# Patient Record
Sex: FEMALE | Race: WHITE | NOT HISPANIC OR LATINO | URBAN - METROPOLITAN AREA
[De-identification: names, ages, dates, MRNs, and addresses within clinical notes are randomized per-mention and may not be internally consistent; named-entity substitution may affect disease eponyms.]

---

## 2017-01-02 ENCOUNTER — AMBULATORY - HEALTHEAST (OUTPATIENT)
Dept: ADMINISTRATIVE | Facility: CLINIC | Age: 77
End: 2017-01-02

## 2017-01-03 ENCOUNTER — OFFICE VISIT - HEALTHEAST (OUTPATIENT)
Dept: GERIATRICS | Facility: CLINIC | Age: 77
End: 2017-01-03

## 2017-01-03 DIAGNOSIS — Z96.659 S/P TOTAL KNEE ARTHROPLASTY: ICD-10-CM

## 2017-01-03 DIAGNOSIS — M79.662 PAIN OF LEFT CALF: ICD-10-CM

## 2017-01-03 DIAGNOSIS — M25.471 ANKLE SWELLING, RIGHT: ICD-10-CM

## 2017-01-03 DIAGNOSIS — K59.00 CONSTIPATION: ICD-10-CM

## 2017-01-03 DIAGNOSIS — E03.9 HYPOTHYROIDISM: ICD-10-CM

## 2017-01-06 ENCOUNTER — OFFICE VISIT - HEALTHEAST (OUTPATIENT)
Dept: GERIATRICS | Facility: CLINIC | Age: 77
End: 2017-01-06

## 2017-01-06 DIAGNOSIS — K59.00 CONSTIPATION: ICD-10-CM

## 2017-01-06 DIAGNOSIS — Z96.659 TOTAL KNEE REPLACEMENT STATUS: ICD-10-CM

## 2017-01-06 DIAGNOSIS — R52 PAIN MANAGEMENT: ICD-10-CM

## 2017-01-06 DIAGNOSIS — M62.838 MUSCLE SPASM: ICD-10-CM

## 2017-01-10 ENCOUNTER — OFFICE VISIT - HEALTHEAST (OUTPATIENT)
Dept: GERIATRICS | Facility: CLINIC | Age: 77
End: 2017-01-10

## 2017-01-10 DIAGNOSIS — Z96.659 TOTAL KNEE REPLACEMENT STATUS: ICD-10-CM

## 2017-01-10 DIAGNOSIS — R52 PAIN MANAGEMENT: ICD-10-CM

## 2017-01-10 DIAGNOSIS — K21.9 ESOPHAGEAL REFLUX: ICD-10-CM

## 2017-01-10 DIAGNOSIS — K59.00 CONSTIPATION: ICD-10-CM

## 2017-01-13 ENCOUNTER — OFFICE VISIT - HEALTHEAST (OUTPATIENT)
Dept: GERIATRICS | Facility: CLINIC | Age: 77
End: 2017-01-13

## 2017-01-13 DIAGNOSIS — Z96.659 TOTAL KNEE REPLACEMENT STATUS: ICD-10-CM

## 2017-01-13 DIAGNOSIS — M15.9 OSTEOARTHRITIS OF MULTIPLE JOINTS: ICD-10-CM

## 2017-01-13 DIAGNOSIS — K59.00 CONSTIPATION: ICD-10-CM

## 2017-01-13 DIAGNOSIS — E03.9 HYPOTHYROIDISM: ICD-10-CM

## 2017-01-16 ENCOUNTER — COMMUNICATION - HEALTHEAST (OUTPATIENT)
Dept: INTERNAL MEDICINE | Facility: CLINIC | Age: 77
End: 2017-01-16

## 2017-01-16 ENCOUNTER — AMBULATORY - HEALTHEAST (OUTPATIENT)
Dept: GERIATRICS | Facility: CLINIC | Age: 77
End: 2017-01-16

## 2017-01-16 ENCOUNTER — COMMUNICATION - HEALTHEAST (OUTPATIENT)
Dept: GERIATRICS | Facility: CLINIC | Age: 77
End: 2017-01-16

## 2017-01-17 ENCOUNTER — COMMUNICATION - HEALTHEAST (OUTPATIENT)
Dept: INTERNAL MEDICINE | Facility: CLINIC | Age: 77
End: 2017-01-17

## 2017-01-27 ENCOUNTER — COMMUNICATION - HEALTHEAST (OUTPATIENT)
Dept: INTERNAL MEDICINE | Facility: CLINIC | Age: 77
End: 2017-01-27

## 2017-01-30 ENCOUNTER — COMMUNICATION - HEALTHEAST (OUTPATIENT)
Dept: INTERNAL MEDICINE | Facility: CLINIC | Age: 77
End: 2017-01-30

## 2017-01-30 DIAGNOSIS — M25.569 KNEE PAIN: ICD-10-CM

## 2017-01-31 ENCOUNTER — COMMUNICATION - HEALTHEAST (OUTPATIENT)
Dept: INTERNAL MEDICINE | Facility: CLINIC | Age: 77
End: 2017-01-31

## 2017-01-31 ENCOUNTER — OFFICE VISIT - HEALTHEAST (OUTPATIENT)
Dept: INTERNAL MEDICINE | Facility: CLINIC | Age: 77
End: 2017-01-31

## 2017-01-31 DIAGNOSIS — E53.8 OTHER B-COMPLEX DEFICIENCIES: ICD-10-CM

## 2017-01-31 DIAGNOSIS — Z96.652 STATUS POST TOTAL LEFT KNEE REPLACEMENT: ICD-10-CM

## 2017-01-31 DIAGNOSIS — E03.9 ACQUIRED HYPOTHYROIDISM: ICD-10-CM

## 2017-01-31 DIAGNOSIS — K59.00 CONSTIPATION: ICD-10-CM

## 2017-03-15 ENCOUNTER — COMMUNICATION - HEALTHEAST (OUTPATIENT)
Dept: INTERNAL MEDICINE | Facility: CLINIC | Age: 77
End: 2017-03-15

## 2017-03-15 DIAGNOSIS — G47.00 INSOMNIA: ICD-10-CM

## 2017-03-21 ENCOUNTER — RECORDS - HEALTHEAST (OUTPATIENT)
Dept: ADMINISTRATIVE | Facility: OTHER | Age: 77
End: 2017-03-21

## 2017-03-27 ENCOUNTER — COMMUNICATION - HEALTHEAST (OUTPATIENT)
Dept: INTERNAL MEDICINE | Facility: CLINIC | Age: 77
End: 2017-03-27

## 2017-03-27 DIAGNOSIS — E56.9 VITAMIN DEFICIENCY SYNDROME: ICD-10-CM

## 2017-03-27 DIAGNOSIS — E03.9 HYPOTHYROIDISM (ACQUIRED): ICD-10-CM

## 2017-05-25 ENCOUNTER — COMMUNICATION - HEALTHEAST (OUTPATIENT)
Dept: INTERNAL MEDICINE | Facility: CLINIC | Age: 77
End: 2017-05-25

## 2017-06-06 ENCOUNTER — COMMUNICATION - HEALTHEAST (OUTPATIENT)
Dept: INTERNAL MEDICINE | Facility: CLINIC | Age: 77
End: 2017-06-06

## 2017-06-06 DIAGNOSIS — E56.9 VITAMIN DEFICIENCY SYNDROME: ICD-10-CM

## 2017-06-06 DIAGNOSIS — G47.00 INSOMNIA: ICD-10-CM

## 2017-06-21 ENCOUNTER — AMBULATORY - HEALTHEAST (OUTPATIENT)
Dept: NURSING | Facility: CLINIC | Age: 77
End: 2017-06-21

## 2017-06-21 DIAGNOSIS — M81.0 OSTEOPOROSIS: ICD-10-CM

## 2017-06-26 ENCOUNTER — AMBULATORY - HEALTHEAST (OUTPATIENT)
Dept: NURSING | Facility: CLINIC | Age: 77
End: 2017-06-26

## 2017-06-27 ENCOUNTER — COMMUNICATION - HEALTHEAST (OUTPATIENT)
Dept: INTERNAL MEDICINE | Facility: CLINIC | Age: 77
End: 2017-06-27

## 2017-08-31 ENCOUNTER — COMMUNICATION - HEALTHEAST (OUTPATIENT)
Dept: INTERNAL MEDICINE | Facility: CLINIC | Age: 77
End: 2017-08-31

## 2017-08-31 DIAGNOSIS — G47.00 INSOMNIA: ICD-10-CM

## 2017-09-21 ENCOUNTER — COMMUNICATION - HEALTHEAST (OUTPATIENT)
Dept: INTERNAL MEDICINE | Facility: CLINIC | Age: 77
End: 2017-09-21

## 2017-09-21 DIAGNOSIS — E03.9 HYPOTHYROIDISM (ACQUIRED): ICD-10-CM

## 2017-10-09 ENCOUNTER — COMMUNICATION - HEALTHEAST (OUTPATIENT)
Dept: INTERNAL MEDICINE | Facility: CLINIC | Age: 77
End: 2017-10-09

## 2017-10-09 DIAGNOSIS — E56.9 VITAMIN DEFICIENCY SYNDROME: ICD-10-CM

## 2017-10-11 ENCOUNTER — COMMUNICATION - HEALTHEAST (OUTPATIENT)
Dept: INTERNAL MEDICINE | Facility: CLINIC | Age: 77
End: 2017-10-11

## 2017-10-11 DIAGNOSIS — E56.9 VITAMIN DEFICIENCY SYNDROME: ICD-10-CM

## 2017-11-14 ENCOUNTER — COMMUNICATION - HEALTHEAST (OUTPATIENT)
Dept: INTERNAL MEDICINE | Facility: CLINIC | Age: 77
End: 2017-11-14

## 2017-11-14 DIAGNOSIS — G47.00 INSOMNIA: ICD-10-CM

## 2018-01-17 ENCOUNTER — AMBULATORY - HEALTHEAST (OUTPATIENT)
Dept: INTERNAL MEDICINE | Facility: CLINIC | Age: 78
End: 2018-01-17

## 2018-01-17 ENCOUNTER — COMMUNICATION - HEALTHEAST (OUTPATIENT)
Dept: INTERNAL MEDICINE | Facility: CLINIC | Age: 78
End: 2018-01-17

## 2018-02-08 ENCOUNTER — COMMUNICATION - HEALTHEAST (OUTPATIENT)
Dept: INTERNAL MEDICINE | Facility: CLINIC | Age: 78
End: 2018-02-08

## 2018-02-08 DIAGNOSIS — G47.00 INSOMNIA: ICD-10-CM

## 2018-05-03 ENCOUNTER — COMMUNICATION - HEALTHEAST (OUTPATIENT)
Dept: INTERNAL MEDICINE | Facility: CLINIC | Age: 78
End: 2018-05-03

## 2018-05-03 DIAGNOSIS — G47.00 INSOMNIA: ICD-10-CM

## 2018-05-21 ENCOUNTER — COMMUNICATION - HEALTHEAST (OUTPATIENT)
Dept: INTERNAL MEDICINE | Facility: CLINIC | Age: 78
End: 2018-05-21

## 2018-06-04 ENCOUNTER — AMBULATORY - HEALTHEAST (OUTPATIENT)
Dept: INTERNAL MEDICINE | Facility: CLINIC | Age: 78
End: 2018-06-04

## 2018-06-04 DIAGNOSIS — M81.0 OSTEOPOROSIS: ICD-10-CM

## 2018-06-19 ENCOUNTER — COMMUNICATION - HEALTHEAST (OUTPATIENT)
Dept: INTERNAL MEDICINE | Facility: CLINIC | Age: 78
End: 2018-06-19

## 2018-07-10 ENCOUNTER — COMMUNICATION - HEALTHEAST (OUTPATIENT)
Dept: INTERNAL MEDICINE | Facility: CLINIC | Age: 78
End: 2018-07-10

## 2018-07-10 DIAGNOSIS — E03.9 HYPOTHYROIDISM (ACQUIRED): ICD-10-CM

## 2018-07-10 DIAGNOSIS — E56.9 VITAMIN DEFICIENCY SYNDROME: ICD-10-CM

## 2018-07-24 ENCOUNTER — OFFICE VISIT - HEALTHEAST (OUTPATIENT)
Dept: INTERNAL MEDICINE | Facility: CLINIC | Age: 78
End: 2018-07-24

## 2018-07-24 DIAGNOSIS — E56.9 VITAMIN DEFICIENCY SYNDROME: ICD-10-CM

## 2018-07-24 DIAGNOSIS — G47.00 INSOMNIA: ICD-10-CM

## 2018-07-24 DIAGNOSIS — M19.90 DJD (DEGENERATIVE JOINT DISEASE): ICD-10-CM

## 2018-07-24 DIAGNOSIS — M81.0 OSTEOPOROSIS: ICD-10-CM

## 2018-07-24 DIAGNOSIS — R52 PAIN MANAGEMENT: ICD-10-CM

## 2018-07-24 DIAGNOSIS — Z98.84 H/O GASTRIC BYPASS: ICD-10-CM

## 2018-07-24 DIAGNOSIS — E03.9 ACQUIRED HYPOTHYROIDISM: ICD-10-CM

## 2018-07-24 LAB
ALBUMIN SERPL-MCNC: 4 G/DL (ref 3.5–5)
ALP SERPL-CCNC: 186 U/L (ref 45–120)
ALT SERPL W P-5'-P-CCNC: 16 U/L (ref 0–45)
ANION GAP SERPL CALCULATED.3IONS-SCNC: 9 MMOL/L (ref 5–18)
AST SERPL W P-5'-P-CCNC: 21 U/L (ref 0–40)
BILIRUB SERPL-MCNC: 0.4 MG/DL (ref 0–1)
BUN SERPL-MCNC: 12 MG/DL (ref 8–28)
C REACTIVE PROTEIN LHE: <0.1 MG/DL (ref 0–0.8)
CALCIUM SERPL-MCNC: 9.8 MG/DL (ref 8.5–10.5)
CHLORIDE BLD-SCNC: 106 MMOL/L (ref 98–107)
CO2 SERPL-SCNC: 27 MMOL/L (ref 22–31)
CREAT SERPL-MCNC: 0.84 MG/DL (ref 0.6–1.1)
ERYTHROCYTE [DISTWIDTH] IN BLOOD BY AUTOMATED COUNT: 14.4 % (ref 11–14.5)
ERYTHROCYTE [SEDIMENTATION RATE] IN BLOOD BY WESTERGREN METHOD: 13 MM/HR (ref 0–20)
FERRITIN SERPL-MCNC: 7 NG/ML (ref 10–130)
GFR SERPL CREATININE-BSD FRML MDRD: >60 ML/MIN/1.73M2
GLUCOSE BLD-MCNC: 96 MG/DL (ref 70–125)
HCT VFR BLD AUTO: 32 % (ref 35–47)
HGB BLD-MCNC: 10.1 G/DL (ref 12–16)
MCH RBC QN AUTO: 23.2 PG (ref 27–34)
MCHC RBC AUTO-ENTMCNC: 31.5 G/DL (ref 32–36)
MCV RBC AUTO: 74 FL (ref 80–100)
PLATELET # BLD AUTO: 371 THOU/UL (ref 140–440)
PMV BLD AUTO: 7 FL (ref 7–10)
POTASSIUM BLD-SCNC: 5.3 MMOL/L (ref 3.5–5)
PROT SERPL-MCNC: 6.7 G/DL (ref 6–8)
RBC # BLD AUTO: 4.35 MILL/UL (ref 3.8–5.4)
SODIUM SERPL-SCNC: 142 MMOL/L (ref 136–145)
TSH SERPL DL<=0.005 MIU/L-ACNC: 0.67 UIU/ML (ref 0.3–5)
WBC: 6.3 THOU/UL (ref 4–11)

## 2018-07-25 ENCOUNTER — COMMUNICATION - HEALTHEAST (OUTPATIENT)
Dept: INTERNAL MEDICINE | Facility: CLINIC | Age: 78
End: 2018-07-25

## 2018-07-25 LAB — 25(OH)D3 SERPL-MCNC: 33.3 NG/ML (ref 30–80)

## 2018-08-24 ENCOUNTER — OFFICE VISIT - HEALTHEAST (OUTPATIENT)
Dept: INTERNAL MEDICINE | Facility: CLINIC | Age: 78
End: 2018-08-24

## 2018-08-24 ENCOUNTER — COMMUNICATION - HEALTHEAST (OUTPATIENT)
Dept: INTERNAL MEDICINE | Facility: CLINIC | Age: 78
End: 2018-08-24

## 2018-08-24 DIAGNOSIS — M25.512 SHOULDER PAIN, BILATERAL: ICD-10-CM

## 2018-08-24 DIAGNOSIS — S22.49XA RIB FRACTURES: ICD-10-CM

## 2018-08-24 DIAGNOSIS — M25.511 SHOULDER PAIN, BILATERAL: ICD-10-CM

## 2018-08-24 DIAGNOSIS — S82.899A ANKLE FRACTURE: ICD-10-CM

## 2018-09-10 ENCOUNTER — COMMUNICATION - HEALTHEAST (OUTPATIENT)
Dept: INTERNAL MEDICINE | Facility: CLINIC | Age: 78
End: 2018-09-10

## 2018-09-12 ENCOUNTER — RECORDS - HEALTHEAST (OUTPATIENT)
Dept: ADMINISTRATIVE | Facility: OTHER | Age: 78
End: 2018-09-12

## 2019-01-18 ENCOUNTER — COMMUNICATION - HEALTHEAST (OUTPATIENT)
Dept: INTERNAL MEDICINE | Facility: CLINIC | Age: 79
End: 2019-01-18

## 2019-01-18 ENCOUNTER — RECORDS - HEALTHEAST (OUTPATIENT)
Dept: ADMINISTRATIVE | Facility: OTHER | Age: 79
End: 2019-01-18

## 2019-02-14 ENCOUNTER — AMBULATORY - HEALTHEAST (OUTPATIENT)
Dept: INTERNAL MEDICINE | Facility: CLINIC | Age: 79
End: 2019-02-14

## 2019-02-14 DIAGNOSIS — M81.0 OSTEOPOROSIS: ICD-10-CM

## 2019-02-18 ENCOUNTER — COMMUNICATION - HEALTHEAST (OUTPATIENT)
Dept: INTERNAL MEDICINE | Facility: CLINIC | Age: 79
End: 2019-02-18

## 2019-02-22 ENCOUNTER — RECORDS - HEALTHEAST (OUTPATIENT)
Dept: ADMINISTRATIVE | Facility: OTHER | Age: 79
End: 2019-02-22

## 2019-04-15 ENCOUNTER — COMMUNICATION - HEALTHEAST (OUTPATIENT)
Dept: INTERNAL MEDICINE | Facility: CLINIC | Age: 79
End: 2019-04-15

## 2019-04-15 DIAGNOSIS — G47.00 INSOMNIA: ICD-10-CM

## 2019-04-24 ENCOUNTER — COMMUNICATION - HEALTHEAST (OUTPATIENT)
Dept: INTERNAL MEDICINE | Facility: CLINIC | Age: 79
End: 2019-04-24

## 2019-04-24 DIAGNOSIS — E03.9 HYPOTHYROIDISM (ACQUIRED): ICD-10-CM

## 2019-05-22 ENCOUNTER — HOSPITAL ENCOUNTER (OUTPATIENT)
Dept: LAB | Age: 79
Setting detail: SPECIMEN
Discharge: HOME | End: 2019-05-22

## 2019-05-22 ENCOUNTER — OFFICE VISIT - HEALTHEAST (OUTPATIENT)
Dept: INTERNAL MEDICINE | Facility: CLINIC | Age: 79
End: 2019-05-22

## 2019-05-22 DIAGNOSIS — M25.511 CHRONIC PAIN OF BOTH SHOULDERS: ICD-10-CM

## 2019-05-22 DIAGNOSIS — E53.8 VITAMIN B12 DEFICIENCY (NON ANEMIC): ICD-10-CM

## 2019-05-22 DIAGNOSIS — Z98.84 BARIATRIC SURGERY STATUS: ICD-10-CM

## 2019-05-22 DIAGNOSIS — E03.9 ACQUIRED HYPOTHYROIDISM: ICD-10-CM

## 2019-05-22 DIAGNOSIS — D50.8 OTHER IRON DEFICIENCY ANEMIA: ICD-10-CM

## 2019-05-22 DIAGNOSIS — M25.512 CHRONIC PAIN OF BOTH SHOULDERS: ICD-10-CM

## 2019-05-22 DIAGNOSIS — G89.29 CHRONIC PAIN OF BOTH SHOULDERS: ICD-10-CM

## 2019-05-22 DIAGNOSIS — M48.061 SPINAL STENOSIS OF LUMBAR REGION WITHOUT NEUROGENIC CLAUDICATION: ICD-10-CM

## 2019-05-22 DIAGNOSIS — M81.0 AGE-RELATED OSTEOPOROSIS WITHOUT CURRENT PATHOLOGICAL FRACTURE: ICD-10-CM

## 2019-05-22 LAB
ALBUMIN SERPL-MCNC: 4.2 G/DL (ref 3.5–5)
ALP SERPL-CCNC: 132 U/L (ref 45–120)
ALT SERPL W P-5'-P-CCNC: 16 U/L (ref 0–45)
ANION GAP SERPL CALCULATED.3IONS-SCNC: 10 MMOL/L (ref 5–18)
AST SERPL W P-5'-P-CCNC: 23 U/L (ref 0–40)
BILIRUB SERPL-MCNC: 0.3 MG/DL (ref 0–1)
BUN SERPL-MCNC: 13 MG/DL (ref 8–28)
CALCIUM SERPL-MCNC: 9.9 MG/DL (ref 8.5–10.5)
CHLORIDE BLD-SCNC: 107 MMOL/L (ref 98–107)
CO2 SERPL-SCNC: 25 MMOL/L (ref 22–31)
CREAT SERPL-MCNC: 0.97 MG/DL (ref 0.6–1.1)
ERYTHROCYTE [DISTWIDTH] IN BLOOD BY AUTOMATED COUNT: 15.3 % (ref 11–14.5)
FERRITIN SERPL-MCNC: 6 NG/ML (ref 10–130)
GFR SERPL CREATININE-BSD FRML MDRD: 56 ML/MIN/1.73M2
GLUCOSE BLD-MCNC: 94 MG/DL (ref 70–125)
HCT VFR BLD AUTO: 31 % (ref 35–47)
HGB BLD-MCNC: 9.8 G/DL (ref 12–16)
MCH RBC QN AUTO: 23.3 PG (ref 27–34)
MCHC RBC AUTO-ENTMCNC: 31.6 G/DL (ref 32–36)
MCV RBC AUTO: 74 FL (ref 80–100)
PLATELET # BLD AUTO: 322 THOU/UL (ref 140–440)
PMV BLD AUTO: 7 FL (ref 7–10)
POTASSIUM BLD-SCNC: 5.1 MMOL/L (ref 3.5–5)
PROT SERPL-MCNC: 6.9 G/DL (ref 6–8)
RBC # BLD AUTO: 4.19 MILL/UL (ref 3.8–5.4)
SODIUM SERPL-SCNC: 142 MMOL/L (ref 136–145)
T4 FREE SERPL-MCNC: 0.7 NG/DL (ref 0.7–1.8)
TSH SERPL DL<=0.005 MIU/L-ACNC: 30.75 UIU/ML (ref 0.3–5)
VIT B12 SERPL-MCNC: 153 PG/ML (ref 213–816)
WBC: 4.9 THOU/UL (ref 4–11)

## 2019-05-22 ASSESSMENT — MIFFLIN-ST. JEOR: SCORE: 1346.32

## 2019-05-23 ENCOUNTER — COMMUNICATION - HEALTHEAST (OUTPATIENT)
Dept: INTERNAL MEDICINE | Facility: CLINIC | Age: 79
End: 2019-05-23

## 2019-05-23 DIAGNOSIS — E03.9 ACQUIRED HYPOTHYROIDISM: ICD-10-CM

## 2019-05-23 LAB — 25(OH)D3 SERPL-MCNC: 13.2 NG/ML (ref 30–80)

## 2019-06-13 ENCOUNTER — RECORDS - HEALTHEAST (OUTPATIENT)
Dept: ADMINISTRATIVE | Facility: OTHER | Age: 79
End: 2019-06-13

## 2019-07-15 ENCOUNTER — COMMUNICATION - HEALTHEAST (OUTPATIENT)
Dept: INTERNAL MEDICINE | Facility: CLINIC | Age: 79
End: 2019-07-15

## 2019-08-13 ENCOUNTER — OFFICE VISIT - HEALTHEAST (OUTPATIENT)
Dept: FAMILY MEDICINE | Facility: CLINIC | Age: 79
End: 2019-08-13

## 2019-08-13 ENCOUNTER — COMMUNICATION - HEALTHEAST (OUTPATIENT)
Dept: SCHEDULING | Facility: CLINIC | Age: 79
End: 2019-08-13

## 2019-08-13 ENCOUNTER — HOSPITAL ENCOUNTER (OUTPATIENT)
Dept: LAB | Age: 79
Setting detail: SPECIMEN
Discharge: HOME | End: 2019-08-13

## 2019-08-13 DIAGNOSIS — R30.0 DYSURIA: ICD-10-CM

## 2019-08-13 DIAGNOSIS — N30.00 ACUTE CYSTITIS WITHOUT HEMATURIA: ICD-10-CM

## 2019-08-13 LAB
BACTERIA #/AREA URNS HPF: ABNORMAL HPF
RBC #/AREA URNS AUTO: ABNORMAL HPF
SQUAMOUS #/AREA URNS AUTO: ABNORMAL LPF
WBC #/AREA URNS AUTO: ABNORMAL HPF
WBC CLUMPS #/AREA URNS HPF: PRESENT /[HPF]

## 2019-08-14 ENCOUNTER — COMMUNICATION - HEALTHEAST (OUTPATIENT)
Dept: FAMILY MEDICINE | Facility: CLINIC | Age: 79
End: 2019-08-14

## 2019-08-14 LAB — BACTERIA SPEC CULT: NO GROWTH

## 2019-08-16 ENCOUNTER — AMBULATORY - HEALTHEAST (OUTPATIENT)
Dept: INTERNAL MEDICINE | Facility: CLINIC | Age: 79
End: 2019-08-16

## 2019-08-16 ENCOUNTER — OFFICE VISIT - HEALTHEAST (OUTPATIENT)
Dept: INTERNAL MEDICINE | Facility: CLINIC | Age: 79
End: 2019-08-16

## 2019-08-16 DIAGNOSIS — E55.9 VITAMIN D DEFICIENCY: ICD-10-CM

## 2019-08-16 DIAGNOSIS — E03.9 ACQUIRED HYPOTHYROIDISM: ICD-10-CM

## 2019-08-16 DIAGNOSIS — R00.1 BRADYCARDIA: ICD-10-CM

## 2019-08-16 DIAGNOSIS — Z98.84 BARIATRIC SURGERY STATUS: ICD-10-CM

## 2019-08-16 DIAGNOSIS — R26.9 ABNORMAL GAIT: ICD-10-CM

## 2019-08-16 DIAGNOSIS — D50.8 IRON DEFICIENCY ANEMIA SECONDARY TO INADEQUATE DIETARY IRON INTAKE: ICD-10-CM

## 2019-08-16 DIAGNOSIS — Z00.00 PREVENTATIVE HEALTH CARE: ICD-10-CM

## 2019-08-16 DIAGNOSIS — H57.02 ANISOCORIA: ICD-10-CM

## 2019-08-16 DIAGNOSIS — E53.8 VITAMIN B12 DEFICIENCY (NON ANEMIC): ICD-10-CM

## 2019-08-16 DIAGNOSIS — M81.0 AGE-RELATED OSTEOPOROSIS WITHOUT CURRENT PATHOLOGICAL FRACTURE: ICD-10-CM

## 2019-08-16 DIAGNOSIS — Z12.31 ENCOUNTER FOR SCREENING MAMMOGRAM FOR MALIGNANT NEOPLASM OF BREAST: ICD-10-CM

## 2019-08-16 DIAGNOSIS — R30.0 DYSURIA: ICD-10-CM

## 2019-08-16 LAB
ALBUMIN SERPL-MCNC: 4 G/DL (ref 3.5–5)
ALBUMIN UR-MCNC: NEGATIVE MG/DL
ALP SERPL-CCNC: 59 U/L (ref 45–120)
ALT SERPL W P-5'-P-CCNC: 17 U/L (ref 0–45)
ANION GAP SERPL CALCULATED.3IONS-SCNC: 9 MMOL/L (ref 5–18)
APPEARANCE UR: CLEAR
AST SERPL W P-5'-P-CCNC: 25 U/L (ref 0–40)
BILIRUB SERPL-MCNC: 0.4 MG/DL (ref 0–1)
BILIRUB UR QL STRIP: NEGATIVE
BUN SERPL-MCNC: 11 MG/DL (ref 8–28)
CALCIUM SERPL-MCNC: 8.9 MG/DL (ref 8.5–10.5)
CHLORIDE BLD-SCNC: 106 MMOL/L (ref 98–107)
CHOLEST SERPL-MCNC: 209 MG/DL
CO2 SERPL-SCNC: 25 MMOL/L (ref 22–31)
COLOR UR AUTO: YELLOW
CREAT SERPL-MCNC: 0.86 MG/DL (ref 0.6–1.1)
ERYTHROCYTE [DISTWIDTH] IN BLOOD BY AUTOMATED COUNT: 17.2 % (ref 11–14.5)
FASTING STATUS PATIENT QL REPORTED: YES
FERRITIN SERPL-MCNC: 5 NG/ML (ref 10–130)
GFR SERPL CREATININE-BSD FRML MDRD: >60 ML/MIN/1.73M2
GLUCOSE BLD-MCNC: 92 MG/DL (ref 70–125)
GLUCOSE UR STRIP-MCNC: NEGATIVE MG/DL
HCT VFR BLD AUTO: 25.7 % (ref 35–47)
HDLC SERPL-MCNC: 59 MG/DL
HGB BLD-MCNC: 8.2 G/DL (ref 12–16)
HGB UR QL STRIP: NEGATIVE
KETONES UR STRIP-MCNC: NEGATIVE MG/DL
LDLC SERPL CALC-MCNC: 130 MG/DL
LEUKOCYTE ESTERASE UR QL STRIP: NEGATIVE
MCH RBC QN AUTO: 24.5 PG (ref 27–34)
MCHC RBC AUTO-ENTMCNC: 31.8 G/DL (ref 32–36)
MCV RBC AUTO: 77 FL (ref 80–100)
NITRATE UR QL: NEGATIVE
PH UR STRIP: 6.5 [PH] (ref 5–8)
PLATELET # BLD AUTO: 418 THOU/UL (ref 140–440)
PMV BLD AUTO: 6.9 FL (ref 7–10)
POTASSIUM BLD-SCNC: 3.7 MMOL/L (ref 3.5–5)
PROT SERPL-MCNC: 6.5 G/DL (ref 6–8)
RBC # BLD AUTO: 3.33 MILL/UL (ref 3.8–5.4)
SODIUM SERPL-SCNC: 140 MMOL/L (ref 136–145)
SP GR UR STRIP: 1.01 (ref 1–1.03)
T4 FREE SERPL-MCNC: <0.4 NG/DL (ref 0.7–1.8)
TRIGL SERPL-MCNC: 100 MG/DL
TSH SERPL DL<=0.005 MIU/L-ACNC: 63.83 UIU/ML (ref 0.3–5)
UROBILINOGEN UR STRIP-ACNC: NORMAL
VIT B12 SERPL-MCNC: 179 PG/ML (ref 213–816)
WBC: 6.1 THOU/UL (ref 4–11)

## 2019-08-16 ASSESSMENT — MIFFLIN-ST. JEOR: SCORE: 1293.87

## 2019-08-19 LAB — 25(OH)D3 SERPL-MCNC: 11.7 NG/ML (ref 30–80)

## 2019-08-20 ENCOUNTER — COMMUNICATION - HEALTHEAST (OUTPATIENT)
Dept: INTERNAL MEDICINE | Facility: CLINIC | Age: 79
End: 2019-08-20

## 2019-09-12 ENCOUNTER — AMBULATORY - HEALTHEAST (OUTPATIENT)
Dept: NURSING | Facility: CLINIC | Age: 79
End: 2019-09-12

## 2019-09-12 DIAGNOSIS — Z23 FLU VACCINE NEED: ICD-10-CM

## 2019-10-10 ENCOUNTER — COMMUNICATION - HEALTHEAST (OUTPATIENT)
Dept: INTERNAL MEDICINE | Facility: CLINIC | Age: 79
End: 2019-10-10

## 2019-10-23 ENCOUNTER — COMMUNICATION - HEALTHEAST (OUTPATIENT)
Dept: SCHEDULING | Facility: CLINIC | Age: 79
End: 2019-10-23

## 2019-10-23 DIAGNOSIS — G47.00 INSOMNIA: ICD-10-CM

## 2019-10-25 ENCOUNTER — COMMUNICATION - HEALTHEAST (OUTPATIENT)
Dept: INTERNAL MEDICINE | Facility: CLINIC | Age: 79
End: 2019-10-25

## 2019-10-25 DIAGNOSIS — G47.00 INSOMNIA: ICD-10-CM

## 2019-10-26 ENCOUNTER — RECORDS - HEALTHEAST (OUTPATIENT)
Dept: SCHEDULING | Facility: CLINIC | Age: 79
End: 2019-10-26

## 2019-11-08 ENCOUNTER — COMMUNICATION - HEALTHEAST (OUTPATIENT)
Dept: INTERNAL MEDICINE | Facility: CLINIC | Age: 79
End: 2019-11-08

## 2019-11-08 DIAGNOSIS — G47.00 INSOMNIA: ICD-10-CM

## 2020-04-29 ENCOUNTER — COMMUNICATION - HEALTHEAST (OUTPATIENT)
Dept: INTERNAL MEDICINE | Facility: CLINIC | Age: 80
End: 2020-04-29

## 2020-04-29 ENCOUNTER — OFFICE VISIT - HEALTHEAST (OUTPATIENT)
Dept: INTERNAL MEDICINE | Facility: CLINIC | Age: 80
End: 2020-04-29

## 2020-04-29 DIAGNOSIS — R30.0 DYSURIA: ICD-10-CM

## 2020-04-29 DIAGNOSIS — D64.9 ANEMIA, UNSPECIFIED TYPE: ICD-10-CM

## 2020-04-29 DIAGNOSIS — Z29.9 ENCOUNTER FOR PREVENTIVE MEASURE: ICD-10-CM

## 2020-04-29 DIAGNOSIS — Z98.84 BARIATRIC SURGERY STATUS: ICD-10-CM

## 2020-04-29 DIAGNOSIS — E03.9 ACQUIRED HYPOTHYROIDISM: ICD-10-CM

## 2020-04-29 DIAGNOSIS — D50.0 IRON DEFICIENCY ANEMIA DUE TO CHRONIC BLOOD LOSS: ICD-10-CM

## 2020-04-29 DIAGNOSIS — Z29.89 ALTITUDE SICKNESS PREVENTATIVE MEASURES: ICD-10-CM

## 2020-05-11 ENCOUNTER — COMMUNICATION - HEALTHEAST (OUTPATIENT)
Dept: INTERNAL MEDICINE | Facility: CLINIC | Age: 80
End: 2020-05-11

## 2020-05-11 DIAGNOSIS — E03.9 HYPOTHYROIDISM (ACQUIRED): ICD-10-CM

## 2020-07-29 ENCOUNTER — COMMUNICATION - HEALTHEAST (OUTPATIENT)
Dept: INTERNAL MEDICINE | Facility: CLINIC | Age: 80
End: 2020-07-29

## 2020-07-29 DIAGNOSIS — G47.00 INSOMNIA: ICD-10-CM

## 2020-11-16 ENCOUNTER — COMMUNICATION - HEALTHEAST (OUTPATIENT)
Dept: INTERNAL MEDICINE | Facility: CLINIC | Age: 80
End: 2020-11-16

## 2020-11-16 DIAGNOSIS — G47.00 INSOMNIA: ICD-10-CM

## 2021-03-18 ENCOUNTER — RECORDS - HEALTHEAST (OUTPATIENT)
Dept: ADMINISTRATIVE | Facility: OTHER | Age: 81
End: 2021-03-18

## 2021-03-20 ENCOUNTER — RECORDS - HEALTHEAST (OUTPATIENT)
Dept: ADMINISTRATIVE | Facility: OTHER | Age: 81
End: 2021-03-20

## 2021-03-22 ENCOUNTER — COMMUNICATION - HEALTHEAST (OUTPATIENT)
Dept: INTERNAL MEDICINE | Facility: CLINIC | Age: 81
End: 2021-03-22

## 2021-03-22 DIAGNOSIS — G47.00 INSOMNIA: ICD-10-CM

## 2021-03-31 ENCOUNTER — COMMUNICATION - HEALTHEAST (OUTPATIENT)
Dept: INTERNAL MEDICINE | Facility: CLINIC | Age: 81
End: 2021-03-31

## 2021-03-31 ENCOUNTER — OFFICE VISIT - HEALTHEAST (OUTPATIENT)
Dept: INTERNAL MEDICINE | Facility: CLINIC | Age: 81
End: 2021-03-31

## 2021-03-31 DIAGNOSIS — D64.9 ANEMIA, UNSPECIFIED TYPE: ICD-10-CM

## 2021-03-31 DIAGNOSIS — T14.8XXA BRUISING: ICD-10-CM

## 2021-03-31 DIAGNOSIS — E56.9 VITAMIN DEFICIENCY: ICD-10-CM

## 2021-03-31 DIAGNOSIS — E61.1 IRON DEFICIENCY: ICD-10-CM

## 2021-03-31 DIAGNOSIS — R29.6 FALLS FREQUENTLY: ICD-10-CM

## 2021-03-31 DIAGNOSIS — M15.8 OTHER OSTEOARTHRITIS INVOLVING MULTIPLE JOINTS: ICD-10-CM

## 2021-03-31 DIAGNOSIS — E03.9 HYPOTHYROIDISM (ACQUIRED): ICD-10-CM

## 2021-03-31 DIAGNOSIS — E55.9 VITAMIN D DEFICIENCY, UNSPECIFIED: ICD-10-CM

## 2021-03-31 DIAGNOSIS — Z09 HOSPITAL DISCHARGE FOLLOW-UP: ICD-10-CM

## 2021-03-31 DIAGNOSIS — Z98.84 BARIATRIC SURGERY STATUS: ICD-10-CM

## 2021-03-31 LAB
ALBUMIN SERPL-MCNC: 4.2 G/DL (ref 3.5–5)
ALP SERPL-CCNC: 100 U/L (ref 45–120)
ALT SERPL W P-5'-P-CCNC: 16 U/L (ref 0–45)
ANION GAP SERPL CALCULATED.3IONS-SCNC: 12 MMOL/L (ref 5–18)
AST SERPL W P-5'-P-CCNC: 28 U/L (ref 0–40)
BILIRUB SERPL-MCNC: 0.5 MG/DL (ref 0–1)
BUN SERPL-MCNC: 13 MG/DL (ref 8–28)
CALCIUM SERPL-MCNC: 9.1 MG/DL (ref 8.5–10.5)
CHLORIDE BLD-SCNC: 101 MMOL/L (ref 98–107)
CO2 SERPL-SCNC: 23 MMOL/L (ref 22–31)
CREAT SERPL-MCNC: 1.04 MG/DL (ref 0.6–1.1)
ERYTHROCYTE [DISTWIDTH] IN BLOOD BY AUTOMATED COUNT: 21.9 % (ref 11–14.5)
FERRITIN SERPL-MCNC: 20 NG/ML (ref 10–130)
GFR SERPL CREATININE-BSD FRML MDRD: 51 ML/MIN/1.73M2
GLUCOSE BLD-MCNC: 85 MG/DL (ref 70–125)
HCT VFR BLD AUTO: 31.2 % (ref 35–47)
HGB BLD-MCNC: 9.1 G/DL (ref 12–16)
IRON SATN MFR SERPL: 15 % (ref 20–50)
IRON SERPL-MCNC: 72 UG/DL (ref 42–175)
MCH RBC QN AUTO: 23.6 PG (ref 27–34)
MCHC RBC AUTO-ENTMCNC: 29.2 G/DL (ref 32–36)
MCV RBC AUTO: 81 FL (ref 80–100)
PLATELET # BLD AUTO: 344 THOU/UL (ref 140–440)
PMV BLD AUTO: 8.8 FL (ref 7–10)
POTASSIUM BLD-SCNC: 4.2 MMOL/L (ref 3.5–5)
PROT SERPL-MCNC: 6.7 G/DL (ref 6–8)
RBC # BLD AUTO: 3.85 MILL/UL (ref 3.8–5.4)
SODIUM SERPL-SCNC: 136 MMOL/L (ref 136–145)
T4 FREE SERPL-MCNC: 0.5 NG/DL (ref 0.7–1.8)
TIBC SERPL-MCNC: 479 UG/DL (ref 313–563)
TRANSFERRIN SERPL-MCNC: 383 MG/DL (ref 212–360)
TSH SERPL DL<=0.005 MIU/L-ACNC: 62.83 UIU/ML (ref 0.3–5)
VIT B12 SERPL-MCNC: 469 PG/ML (ref 213–816)
WBC: 6.8 THOU/UL (ref 4–11)

## 2021-03-31 RX ORDER — FERROUS SULFATE 325(65) MG
1 TABLET ORAL 2 TIMES DAILY
Qty: 60 TABLET | Refills: 6 | Status: SHIPPED | OUTPATIENT
Start: 2021-03-31 | End: 2022-03-11

## 2021-03-31 RX ORDER — ERGOCALCIFEROL 1.25 MG/1
50000 CAPSULE ORAL WEEKLY
Qty: 12 CAPSULE | Refills: 1 | Status: SHIPPED | OUTPATIENT
Start: 2021-03-31 | End: 2021-06-17

## 2021-03-31 ASSESSMENT — MIFFLIN-ST. JEOR: SCORE: 1348.02

## 2021-04-01 ENCOUNTER — COMMUNICATION - HEALTHEAST (OUTPATIENT)
Dept: INTERNAL MEDICINE | Facility: CLINIC | Age: 81
End: 2021-04-01

## 2021-04-01 LAB — 25(OH)D3 SERPL-MCNC: 11.9 NG/ML (ref 30–80)

## 2021-04-09 ENCOUNTER — AMBULATORY - HEALTHEAST (OUTPATIENT)
Dept: OTHER | Facility: CLINIC | Age: 81
End: 2021-04-09

## 2021-04-09 ENCOUNTER — DOCUMENTATION ONLY (OUTPATIENT)
Dept: OTHER | Facility: CLINIC | Age: 81
End: 2021-04-09

## 2021-04-16 ENCOUNTER — COMMUNICATION - HEALTHEAST (OUTPATIENT)
Dept: ADMINISTRATIVE | Facility: CLINIC | Age: 81
End: 2021-04-16

## 2021-04-16 DIAGNOSIS — Z98.84 BARIATRIC SURGERY STATUS: ICD-10-CM

## 2021-04-16 DIAGNOSIS — E53.8 VITAMIN B12 DEFICIENCY (NON ANEMIC): ICD-10-CM

## 2021-04-16 RX ORDER — CYANOCOBALAMIN 1000 UG/ML
INJECTION, SOLUTION INTRAMUSCULAR; SUBCUTANEOUS
Qty: 4 ML | Status: SHIPPED | OUTPATIENT
Start: 2021-04-16 | End: 2022-05-07

## 2021-04-16 RX ORDER — CYANOCOBALAMIN 1000 UG/ML
1000 INJECTION, SOLUTION INTRAMUSCULAR; SUBCUTANEOUS
Refills: 3 | Status: SHIPPED | OUTPATIENT
Start: 2021-05-07 | End: 2022-05-06

## 2021-04-16 RX ORDER — CYANOCOBALAMIN 1000 UG/ML
1000 INJECTION, SOLUTION INTRAMUSCULAR; SUBCUTANEOUS
Refills: 3 | Status: SHIPPED | OUTPATIENT
Start: 2021-04-16 | End: 2021-05-06

## 2021-04-19 ENCOUNTER — COMMUNICATION - HEALTHEAST (OUTPATIENT)
Dept: ADMINISTRATIVE | Facility: CLINIC | Age: 81
End: 2021-04-19

## 2021-04-19 DIAGNOSIS — E03.9 HYPOTHYROIDISM (ACQUIRED): ICD-10-CM

## 2021-04-19 RX ORDER — LEVOTHYROXINE SODIUM 75 UG/1
75 TABLET ORAL DAILY
Qty: 30 TABLET | Refills: 1 | Status: SHIPPED | OUTPATIENT
Start: 2021-04-19

## 2021-04-20 ENCOUNTER — COMMUNICATION - HEALTHEAST (OUTPATIENT)
Dept: ADMINISTRATIVE | Facility: CLINIC | Age: 81
End: 2021-04-20

## 2021-04-28 ENCOUNTER — COMMUNICATION - HEALTHEAST (OUTPATIENT)
Dept: SCHEDULING | Facility: CLINIC | Age: 81
End: 2021-04-28

## 2021-04-30 ENCOUNTER — COMMUNICATION - HEALTHEAST (OUTPATIENT)
Dept: INTERNAL MEDICINE | Facility: CLINIC | Age: 81
End: 2021-04-30

## 2021-05-01 ENCOUNTER — RECORDS - HEALTHEAST (OUTPATIENT)
Dept: INTERNAL MEDICINE | Facility: CLINIC | Age: 81
End: 2021-05-01

## 2021-05-04 ENCOUNTER — COMMUNICATION - HEALTHEAST (OUTPATIENT)
Dept: INTERNAL MEDICINE | Facility: CLINIC | Age: 81
End: 2021-05-04

## 2021-05-04 ENCOUNTER — OFFICE VISIT - HEALTHEAST (OUTPATIENT)
Dept: INTERNAL MEDICINE | Facility: CLINIC | Age: 81
End: 2021-05-04

## 2021-05-04 DIAGNOSIS — E03.9 ACQUIRED HYPOTHYROIDISM: ICD-10-CM

## 2021-05-04 DIAGNOSIS — E03.9 HYPOTHYROIDISM (ACQUIRED): ICD-10-CM

## 2021-05-04 DIAGNOSIS — Z98.84 BARIATRIC SURGERY STATUS: ICD-10-CM

## 2021-05-04 DIAGNOSIS — D64.9 ANEMIA, UNSPECIFIED TYPE: ICD-10-CM

## 2021-05-04 LAB
ERYTHROCYTE [DISTWIDTH] IN BLOOD BY AUTOMATED COUNT: 23.2 % (ref 11–14.5)
HCT VFR BLD AUTO: 37.2 % (ref 35–47)
HGB BLD-MCNC: 11.2 G/DL (ref 12–16)
MCH RBC QN AUTO: 27.2 PG (ref 27–34)
MCHC RBC AUTO-ENTMCNC: 30.1 G/DL (ref 32–36)
MCV RBC AUTO: 90 FL (ref 80–100)
PLATELET # BLD AUTO: 302 THOU/UL (ref 140–440)
PMV BLD AUTO: 9.4 FL (ref 8.5–12.5)
RBC # BLD AUTO: 4.12 MILL/UL (ref 3.8–5.4)
TSH SERPL DL<=0.005 MIU/L-ACNC: 4.33 UIU/ML (ref 0.3–5)
WBC: 6.2 THOU/UL (ref 4–11)

## 2021-05-04 RX ORDER — LEVOTHYROXINE SODIUM 125 UG/1
125 TABLET ORAL DAILY
Qty: 90 TABLET | Refills: 3 | Status: SHIPPED | OUTPATIENT
Start: 2021-05-04 | End: 2022-03-11

## 2021-05-04 ASSESSMENT — MIFFLIN-ST. JEOR: SCORE: 1311.45

## 2021-05-24 ENCOUNTER — RECORDS - HEALTHEAST (OUTPATIENT)
Dept: ADMINISTRATIVE | Facility: CLINIC | Age: 81
End: 2021-05-24

## 2021-05-25 ENCOUNTER — RECORDS - HEALTHEAST (OUTPATIENT)
Dept: ADMINISTRATIVE | Facility: CLINIC | Age: 81
End: 2021-05-25

## 2021-05-26 ENCOUNTER — RECORDS - HEALTHEAST (OUTPATIENT)
Dept: ADMINISTRATIVE | Facility: CLINIC | Age: 81
End: 2021-05-26

## 2021-05-27 ENCOUNTER — RECORDS - HEALTHEAST (OUTPATIENT)
Dept: ADMINISTRATIVE | Facility: CLINIC | Age: 81
End: 2021-05-27

## 2021-05-27 VITALS
HEIGHT: 67 IN | DIASTOLIC BLOOD PRESSURE: 70 MMHG | SYSTOLIC BLOOD PRESSURE: 134 MMHG | HEART RATE: 62 BPM | OXYGEN SATURATION: 98 % | WEIGHT: 178.31 LBS | BODY MASS INDEX: 27.99 KG/M2

## 2021-05-27 NOTE — TELEPHONE ENCOUNTER
Controlled Substance Refill Request  Medication Name:   Requested Prescriptions     Pending Prescriptions Disp Refills     zolpidem (AMBIEN) 10 mg tablet 180 tablet 2     Sig: Take 2 tablets (20 mg total) by mouth at bedtime.     Date Last Fill: 7/24/2018  Ph[facility]: [facility]      Submit electronically to pharmacy  Controlled Substance Agreement Date Scanned:   Encounter-Level CSA Scan Date - 05/20/2016:    Scan on 5/24/2016 12:30 PM: Hydrocodone (below)    Scan on 5/24/2016 12:29 PM: Zolpidem (below)         Last office visit with prescriber/PCP: 8/24/2018 Georgina Cerna MD OR same dept: 8/24/2018 Georgina Cerna MD OR same specialty: 8/24/2018 Georgina Cerna MD  Last physical: 4/3/2015 Last MTM visit: Visit date not found

## 2021-05-28 ENCOUNTER — RECORDS - HEALTHEAST (OUTPATIENT)
Dept: ADMINISTRATIVE | Facility: CLINIC | Age: 81
End: 2021-05-28

## 2021-05-28 NOTE — TELEPHONE ENCOUNTER
Spoke to Stefany today to reschedule her appointment with Dr. Cerna originally for 4/26/19. She says she is out of Ambien and Synthroid and requests refills. She would prefer to wait and see Dr. Cerna in a few weeks if she can get refills. She will call to make appointment when she has her calendar.

## 2021-05-28 NOTE — TELEPHONE ENCOUNTER
Pt requested refill of her Ambien and levothyroxine.  Contacted pharmacy, as PCP sent in prescription for Ambien on 4/17/19.  Pharmacist stated pt's birthday is incorrect on the insurance information, and pt needs to call insurance to update this prior to filling her prescription.  [facility] contacted pt and relayed this.  Pt understanding.

## 2021-05-29 ENCOUNTER — RECORDS - HEALTHEAST (OUTPATIENT)
Dept: ADMINISTRATIVE | Facility: CLINIC | Age: 81
End: 2021-05-29

## 2021-05-29 NOTE — TELEPHONE ENCOUNTER
Stefany, your TSH is quite elevated.  This means your thyroid is running low.  Have you been taking her medications daily?  Are you taking them on an empty stomach?

## 2021-05-29 NOTE — PROGRESS NOTES
[facility] Follow Up Note    Assessment/Plan:  1. Chronic pain of both shoulders  Continues to complain of significant pain related to musculoskeletal issues.  Today, chronic shoulder pain.  Setting is multi-joint DJD.  Recommendation: We will give short course of prednisone to alleviate acute symptoms.  Referral to both orthopedic surgery and physical therapy.  She has advanced multi-joint DJD.  She will need orthopedic surgery consultation.  - predniSONE (DELTASONE) 20 MG tablet; Take 40 mg by mouth daily.  Dispense: 10 tablet; Refill: 0  - Ambulatory referral to Orthopedics  - Ambulatory referral to Physical Therapy    2. Spinal stenosis of lumbar region without neurogenic claudication  As above, continues to struggle with spinal stenosis of the lumbar spine.  She ambulates with a crutch.  Pain is chronic.  - predniSONE (DELTASONE) 20 MG tablet; Take 40 mg by mouth daily.  Dispense: 10 tablet; Refill: 0  - Ambulatory referral to Physical Therapy    3. Age-related osteoporosis without current pathological fracture  On Prolia.  Of note, she is contemplating a dental implants in the future.  She is advised to notify her dentist or periodontist about her Prolia use.  We would electively schedule any jaw surgery when she is off Prolia.  She is currently noncompliant with calcium and vitamin D.  She is at risk for hypocalcemia.  She is advised to begin calcium citrate ASAP along with vitamin D.  - HM2(CBC w/o Differential)  - Comprehensive Metabolic Panel  - Vitamin D, Total (25-Hydroxy)    4. Bariatric surgery status  We will update labs  - HM2(CBC w/o Differential)  - Ferritin    5. Vitamin B12 deficiency (non anemic)  Will update labs  - HM2(CBC w/o Differential)  - Vitamin B12    6. Acquired hypothyroidism  For labs  - Thyroid [place]      Follow-up for annual wellness in the fall    Georgina Cerna MD    Chief Complaint:  Chief Complaint   Patient presents with     Follow-up       History of  "Present Illness:  Stefany is a 78 y.o. female who is here today for discussion of her usual chronic medical problems.  Of note, she is status post bariatric surgery remotely.  She is intermittently noncompliant with her supplements and has been so recently.  Today, she laments her multi-joint DJD.  She has been under the care of many musculoskeletal physicians for chronic back pain, foot pain and now shoulder pain.  She states the pain alternates between shoulders.  She did have one fall where she landed on her right shoulder.  She states that \"I am getting old \".  She last off her worsening DJD.    She continues to have insomnia.  She is wondering if there are any medications that she can take for pain other than the gabapentin for which she is already taking this.    She has no other complaints.  She continues to feel frustrated with regard to her unhappy marriage.  She wished that she lived alone.    Review of Systems:  A comprehensive review of systems was performed and was otherwise negative    PFSH:  Social History: She is  with adult children.  She has made her peace with her adopted son.  She has a kathy bull.  She and her  live in parallel.  Social History     Tobacco Use   Smoking Status Never Smoker   Smokeless Tobacco Never Used       Past History:   Past Medical History:   Diagnosis Date     DJD (degenerative joint disease)        Current Outpatient Medications   Medication Sig Dispense Refill     fluticasone (FLONASE) 50 mcg/actuation nasal spray 2 sprays into each nostril bedtime.       gabapentin (NEURONTIN) 100 MG capsule One to two tabs tid 120 capsule 3     levothyroxine (SYNTHROID, LEVOTHROID) 125 MCG tablet TAKE 1 TABLET BY MOUTH EVERY DAY 90 tablet 3     zolpidem (AMBIEN) 10 mg tablet Take 2 tablets (20 mg total) by mouth at bedtime. 180 tablet 2     predniSONE (DELTASONE) 20 MG tablet Take 40 mg by mouth daily. 10 tablet 0     Current Facility-Administered Medications   Medication " "Dose Route Frequency Provider Last Rate Last Dose     cyanocobalamin injection 1,000 mcg  1,000 mcg Intramuscular Q30 Days Georgina Cerna MD   1,000 mcg at 07/24/18 1313     denosumab 60 mg (PROLIA 60 mg/ml)  60 mg Subcutaneous Q6 Months Ryan Irizarry, PharmD   60 mg at 05/22/19 1235       Family History: Nothing new    Physical Exam:  General Appearance:   Weight is up 25 pounds  Vitals:    05/22/19 1232   BP: 112/70   Patient Site: Left Arm   Patient Position: Sitting   Cuff Size: Adult Regular   Pulse: 60   Weight: 186 lb (84.4 kg)   Height: 5' 7\" (1.702 m)     Wt Readings from Last 3 Encounters:   05/22/19 186 lb (84.4 kg)   08/24/18 161 lb (73 kg)   07/24/18 167 lb 8 oz (76 kg)     Body mass index is 29.13 kg/m .    She is ambulating with an altered gait with a crutch.    "

## 2021-05-29 NOTE — TELEPHONE ENCOUNTER
Per patient, she not taking any vitamin  supplements. She also stated that she will take B12 sublingial once a day, vitamin D3 5000 a day and will take iron twice daily with foods, and recheck in the next two three months.

## 2021-05-29 NOTE — TELEPHONE ENCOUNTER
Pt states that she has been out of her medications for about a month   Pt will  rx and start taking it again

## 2021-05-29 NOTE — PROGRESS NOTES
The following steps were completed to comply with the REMS program for Prolia:  1. Ordering provider has previously reviewed information in the Medication Guide and Patient Counseling Chart, including the serious risks of Prolia  and the symptoms of each risk and have been advised  to seek prompt medical attention if they have signs or symptoms of any of the serious risks.  2. Provided each patient a copy of the Medication Guide and Patient Brochure.  See MAR for administration details.   Indication: Prolia  (denosumab) is a prescription medicine used to treat osteoporosis in patients who:   Are at high risk for fracture, meaning patients who have had a fracture related to osteoporosis, or who have multiple risk factors for fracture; Cannot use another osteoporosis medicine or other osteoporosis medicines did not work well.   The timeline for early/late injections would be 4 weeks early and any time after the 6 month aramis. If a patient receives their injection late, then the subsequent injection would be 6 months from the date that they actually received the injection    1.  When was the last injection?  7/24/18    2.  Has insurance for this injection been verified?  Yes    3.  Did you experience any new onset achiness or rashes that lasted for over a month with your previous Prolia injection?   No    4.  Do you have a fever over 101?F or a new deep cough that is unusual for you today? No    5.  Have you started any new medications in the last 6 months that you were told could affect your immune system? These may have been prescribed by oncologist, transplant, rheumatology, or dermatology.   No    6.  In the last 6 months have you have gastric bypass or parathyroid surgery?   No    7.  Do you plan dental work requiring drilling into the bone such as implants/extractions or oral surgery in the next 2-3 months?   No

## 2021-05-29 NOTE — TELEPHONE ENCOUNTER
Please contact Stefany.  In addition to not taking her thyroid medicines, it looks like she has not been taking her B12 or vitamin D.  Can you get some clarification on that.  Also, she is markedly deficient in iron.    Find out what she has at home.  Recommendations: She can return for B12 injection if she would like.  Otherwise, she could purchase some sublingual B12 and take daily.  Additionally, she should be on an oral iron supplement that she takes with food twice daily.  With regard to vitamin D, we can either do ergocalciferol and high dose once a week where she can purchase vitamin D3 and take 5000 international units every single day.    She needs to return in a couple of months to have all of these things rechecked.

## 2021-05-29 NOTE — TELEPHONE ENCOUNTER
Contacted patient and gave message below. Patient verbalizes understanding and has no further questions or concerns at this time.

## 2021-05-30 VITALS — BODY MASS INDEX: 25.83 KG/M2 | WEIGHT: 164.9 LBS

## 2021-05-30 NOTE — TELEPHONE ENCOUNTER
Spoke with pt's spouse, who walked into clinic with concerns. Pt stated he's noticed increased confusion, short term memory loss.  Pt's spouse reports pt has made several dental appointments, forgetting she had scheduled them.  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  Talks with people on the telephone and forgets an hour later who she spoke. Pt's spouse state pt blames it on the Ambien, stating Ambien causes confusion with everyone.     Pt's spouse states some days are better than others, but he has noticed a definite increase in confusion and forgetfulness over the past few years.     Pt's spouse states pt would be extremely upset if she found out pt had come to clinic with concerns. Pt's spouse would like a call back in the AM with PCP's suggestions.

## 2021-05-30 NOTE — TELEPHONE ENCOUNTER
Called pt's spouse and relay below message. Stef stated that lhe will call our office later and set up appt.

## 2021-05-30 NOTE — TELEPHONE ENCOUNTER
I think the best thing to do would be for Stefany to schedule an appointment with me so I can review the 's concerns in a discrete way.  He could offer to accompany her so that we could have a conversation together

## 2021-05-31 NOTE — TELEPHONE ENCOUNTER
Please contact Stefany and let her know that her thyroid is profoundly low as are all of her vitamin levels.  She needs to be compliant with medications and supplements especially with her history of having had bariatric surgery.  I need her back in 2 months to recheck all levels.  I am confident that if she takes her medications as directed and her supplements as ordered, she will have a much stronger sense of well-being.    Let her know I mailed her the lab results.

## 2021-05-31 NOTE — TELEPHONE ENCOUNTER
Triage note:    78 year old female called with concerns about 3 days of urinary symptoms.     She has taken Naproxen without much relief.  She has burning with urination, no fever.     Last time she had a UTI was many months ago.     RN triaged to be seen within 4 hours.  She agreed.  Patient warm transferred to scheduling for appointment.  She took down available appointments this afternoon and will discuss with her  when he returns soon.      Fani Dan RN, [facility] Refill/Triage, 8/13/2019 2:22 PM    RN called to check on patient to see if she or her  had any additional questions. No answer. No message left.  Fani Dan RN, [facility] Refill/Triage, 8/13/2019 2:40 PM      Reason for Disposition    Artificial heart valve or artificial joint     Hip, shoulders    Protocols used: URINATION PAIN - FEMALE-A-

## 2021-05-31 NOTE — PROGRESS NOTES
[facility]      CHIEF COMPLAINT/REASON FOR VISIT:  Chief Complaint   Patient presents with     dysuria     orange colored urine       HISTORY:      HPI: Stefany is a 78 y.o. female who  has a past medical history of DJD (degenerative joint disease).  Stefany states that she is normally pretty healthy but does have a significant history of urinary tract infections.  She states that she knows she has a UTI.  Stefany has had dysuria, urinary frequency, urinary hesitancy and discolored urine for the past 2 to 3 days.  She states that these are all signs and symptoms of UTI for her.  She denies flank pain. She states that she otherwise is feeling well and has no other concerns to report. She denies any other concerns including fevers/chills, cough or cold symptoms, headaches, vision changes, chest pain/pressure, difficulty breathing, SOB, abdominal pain, nausea, vomiting, diarrhea, increasing weakness, increasing pain.     Past Medical History:   Diagnosis Date     DJD (degenerative joint disease)              No family history on file.  Social History     Socioeconomic History     Marital status:      Spouse name: None     Number of children: None     Years of education: None     Highest education level: None   Occupational History     None   Social Needs     Financial resource strain: None     Food insecurity:     Worry: None     Inability: None     Transportation needs:     Medical: None     Non-medical: None   Tobacco Use     Smoking status: Never Smoker     Smokeless tobacco: Never Used   Substance and Sexual Activity     Alcohol use: No     Drug use: No     Sexual activity: None   Lifestyle     Physical activity:     Days per week: None     Minutes per session: None     Stress: None   Relationships     Social connections:     Talks on phone: None     Gets together: None     Attends Zoroastrian service: None     Active member of club or organization: None     Attends meetings of clubs or  organizations: None     Relationship status: None     Intimate partner violence:     Fear of current or ex partner: None     Emotionally abused: None     Physically abused: None     Forced sexual activity: None   Other Topics Concern     None   Social History Narrative     None       REVIEW OF SYSTEM:  Per HPI    PHYSICAL EXAM:   /84 (Patient Site: Right Arm, Patient Position: Sitting, Cuff Size: Adult Regular)   Pulse 65   Temp 98.2  F (36.8  C) (Oral)   Resp 16   Wt 174 lb (78.9 kg)   SpO2 99%   BMI 27.25 kg/m    General appearance: alert, appears stated age and cooperative  HEENT: Head is normocephalic with normal hair distribution. No evidence of trauma. Ears: Without lesions or deformity. No acute purulent discharge. Eyes: Conjunctivae pink with no scleral icterus or erythema. Nose: Normal mucosa and septum. Oropharnyx: mmm, no lesions present.  Lungs: clear to auscultation bilaterally, respirations without effort  Heart: regular rate and rhythm, S1, S2 normal, no murmur, click, rub or gallop  Abdomen: soft, non-tender; bowel sounds normal; no masses,  no organomegaly  Extremities: extremities normal, atraumatic, no cyanosis or edema  Pulses: 2+ and symmetric  Skin: Skin color, texture, turgor normal. No rashes or lesions  Neurologic: Grossly normal   Psych: interacts well with caregivers, exhibits logical thought processes and connections, pleasant      LABS:   UA/UC    ASSESSMENT:      ICD-10-CM    1. Dysuria R30.0 Urine,Microscopic     Culture, Urine     CANCELED: Urinalysis-UC if Indicated   2. Acute cystitis without hematuria N30.00 cephalexin (KEFLEX) 500 MG capsule       PLAN:    UTI  -Keflex 500 mg by mouth two times a day x 10 days.   -Encourage fluids.  -Follow-up with primary care provider this week.  -Discussed worrisome signs and symptoms such as increasing fatigue, back pain, flank pain, fevers or chills or any other symptoms that cause concern.  Discussed going to ER with any of these  concerns.    All questions answered to patient's satisfaction. No further questions posed, no comments or issues to report. Patient advised to return to primary care provider, urgent care or ER with any further complaints, issues or concerns.    Electronically signed by: Maria L Corey CNP

## 2021-05-31 NOTE — PROGRESS NOTES
Assessment and Plan:   Annual wellness forms reviewed.  No specific needs identified.  In fact, she is taking care of her house and family.  She believes that she needs a divorce.  This would solve many of her problems.    1. Preventative health care  Of note, she is due for mammography.  She is due for an eye exam as well.  She gets regular dental care.  Immunizations reviewed.  She has had 3 pneumonia vaccines.  She is due for shingles vaccine and will check her insurance.  She takes a flu shot in the fall.  She is limited with regard to physical activity due to her severe DJD of multiple joints.  However, she cuts wood, mows the lawn, etc.  She eats minimal food.  She states she will have a broccoli.  She does not pay much attention to food intake.  Have stressed the importance of good nutrition.  - Lipid McLean FASTING  - Mammo Screening Bilateral; Future    2. Acquired hypothyroidism  Last TSH was quite elevated.  Patient has been noncompliant with thyroid medications.  Hopefully, she has resumed her medicine.  Will recheck.  - Thyroid Cascade    3. Vitamin D deficiency  Poor compliance with vitamin D in the setting of osteoporosis.  She is instructed to begin ergocalciferol weekly  - Comprehensive Metabolic Panel  - Vitamin D, Total (25-Hydroxy)  - ergocalciferol (ERGOCALCIFEROL) 50,000 unit capsule; Take 1 capsule (50,000 Units total) by mouth once a week.  Dispense: 12 capsule; Refill: 3    4. Vitamin B12 deficiency (non anemic)  Noncompliant with B12 injections.  One will be provided today.  Initiate sublingual B12 at 500 mcg daily.  - Comprehensive Metabolic Panel  - Vitamin B12  - cyanocobalamin injection 1,000 mcg    5. Bariatric surgery status  Absolutely abysmal compliance with iron and vitamins status post Hector-en-Y bypass.  Will give B12 today  - Comprehensive Metabolic Panel  - cyanocobalamin injection 1,000 mcg    6. Iron deficiency anemia due to poor intake, bariatric surgery status  We will  update labs today.  Patient encouraged to purchase ferrous gluconate and take 1 to 2 tablets/day.  She vows to be compliant  - HM2(CBC w/o Differential)  - Comprehensive Metabolic Panel  - Vitamin B12  - Ferritin    7. Age-related osteoporosis without current pathological fracture  She has not followed through with Prolia injections.  She will take calcium tablets daily.  Prolia provided today  No up and coming dental surgery or procedures.  Of note, she has unsteady gait as it result of B12 deficiency, multiple DJD abnormalities.  Will refer for gait and balance assessment as well area  - DXA Bone Density Scan; Future  - denosumab 60 mg (PROLIA 60 mg/ml)  - Ambulatory referral to Physical Therapy    8. Encounter for screening mammogram for malignant neoplasm of breast   Ordered  - Mammo Screening Bilateral; Future    9. Anisocoria  Pupils are unequal with right pupil pinpoint and other not.  They both were asked to light.  Recommendation: See ophthalmologist-patient denies prior surgeries or trauma  - Ambulatory referral to Ophthalmology    10. Bradycardia  Chronic.    11. Dysuria  We will recheck urine analysis.  Of note, she was seen and treated for potential UTI.  However, urine analysis was negative.  Of note, her fluid intake is minuscule.  Recommendation: 64 ounces of fluid daily  - Urinalysis-UC if Indicated    12. Abnormal gait  As above  - Ambulatory referral to Physical Therapy     The patient's current medical problems were reviewed.      The following health maintenance schedule was reviewed with the patient and provided in printed form in the after visit summary:   Health Maintenance   Topic Date Due     MEDICARE ANNUAL WELLNESS VISIT  10/07/2005     ZOSTER VACCINES (2 of 2) 05/29/2015     DXA SCAN  11/18/2016     INFLUENZA VACCINE RULE BASED (1) 08/01/2019     ADVANCE CARE PLANNING  01/23/2020     FALL RISK ASSESSMENT  05/22/2020     TD 18+ HE  05/17/2021     PNEUMOCOCCAL POLYSACCHARIDE VACCINE AGE 65  "AND OVER  Completed     PNEUMOCOCCAL CONJUGATE VACCINE FOR ADULTS (PCV13 OR PREVNAR)  Completed        Subjective:   Chief Complaint: Stefany Subramanian is an 78 y.o. female here for an Annual Wellness visit.   HPI: Stefany is a pleasant 78-year-old female who is status post bariatric surgery.  She is woefully noncompliant with any of her vitamin supplementations.  She feels that she is \"getting old\" .  She states \"I do not have to fix everything \".  After discussion, she admits that she has not been taking iron, B12, vitamin D or any of the supplements recommended.  She has been noncompliant with her thyroid medications as well.  She does report that she feels as though she is functioning fairly well.  She does not want to be on a lot of medications.    She does describe an unsteady gait.  She is status post multiple joint surgeries.  Also, she has a B12 deficiency and has not been compliant with that.    Much of the conversation today is with regard to social well-being.  She states that she would like to be .  She has met with a .  She is not happy in her current living situation.  She states her  is condescending and dismissive.  He does not think that she can live without him.  In truth, she provides all of the maintenance, yard work, food preparation etc. for the home.    Review of Systems: She describes urinary discomfort on occasion.  She does not drink fluids.  She eats 1 or 2 spotty meals per day.  She denies any bowel issues.  Please see above.  The rest of the review of systems are negative for all systems.    Patient Care Team:  Georgina Cerna MD as PCP - General     Patient Active Problem List   Diagnosis     Vitamin D Deficiency     Esophageal Reflux     Osteoarthritis Of Multiple Sites     Vitamin B12 deficiency (non anemic)     Anemia     Cervical Spine Stenosis     Spinal Stenosis     Hypothyroidism     Carpal Tunnel Syndrome     Osteoporosis on Prolia     Sleep Apnea     " Chronic Sinusitis     Chronic Pulpitis     Tailor's Bunion     Pain During Urination (Dysuria)     Foreign Body In The Foot     Total knee replacement status     Constipation     Pain management     Iron deficiency anemia due to chronic blood loss     Bariatric surgery status     Past Medical History:   Diagnosis Date     DJD (degenerative joint disease)       Past Surgical History:   Procedure Laterality Date     TN APPENDECTOMY      Description: Appendectomy;  Recorded: 05/08/2009;     TN GASTRIC BYPASS,OBESE<150CM HECTOR-EN-Y      Description: Gastric Surgery For Morbid Obesity Bypass With Hector-en-Y;  Recorded: 01/22/2013;     TN REMOVAL GALLBLADDER      Description: Cholecystectomy;  Recorded: 05/08/2009;     TN TOTAL ABDOM HYSTERECTOMY      Description: Total Abdominal Hysterectomy;  Recorded: 05/08/2009;     TN TOTAL HIP ARTHROPLASTY      Description: Total Hip Replacement;  Recorded: 05/08/2009;  Comments: left     TN TOTAL HIP ARTHROPLASTY      Description: Total Hip Replacement;  Recorded: 04/16/2013;     TOTAL KNEE ARTHROPLASTY Left 12/26/2016    Dr. Pepe       No family history on file.   Social History     Socioeconomic History     Marital status:      Spouse name: Not on file     Number of children: Not on file     Years of education: Not on file     Highest education level: Not on file   Occupational History     Not on file   Social Needs     Financial resource strain: Not on file     Food insecurity:     Worry: Not on file     Inability: Not on file     Transportation needs:     Medical: Not on file     Non-medical: Not on file   Tobacco Use     Smoking status: Never Smoker     Smokeless tobacco: Never Used   Substance and Sexual Activity     Alcohol use: No     Drug use: No     Sexual activity: Not on file   Lifestyle     Physical activity:     Days per week: Not on file     Minutes per session: Not on file     Stress: Not on file   Relationships     Social connections:     Talks on phone:  "Not on file     Gets together: Not on file     Attends Quaker service: Not on file     Active member of club or organization: Not on file     Attends meetings of clubs or organizations: Not on file     Relationship status: Not on file     Intimate partner violence:     Fear of current or ex partner: Not on file     Emotionally abused: Not on file     Physically abused: Not on file     Forced sexual activity: Not on file   Other Topics Concern     Not on file   Social History Narrative     Not on file      Current Outpatient Medications   Medication Sig Dispense Refill     levothyroxine (SYNTHROID, LEVOTHROID) 125 MCG tablet TAKE 1 TABLET BY MOUTH EVERY DAY 90 tablet 3     naproxen (NAPROSYN) 500 MG tablet TK 1 T PO BID PRF PAIN  11     zolpidem (AMBIEN) 10 mg tablet Take 2 tablets (20 mg total) by mouth at bedtime. 180 tablet 2     ergocalciferol (ERGOCALCIFEROL) 50,000 unit capsule Take 1 capsule (50,000 Units total) by mouth once a week. 12 capsule 3     Current Facility-Administered Medications   Medication Dose Route Frequency Provider Last Rate Last Dose     cyanocobalamin injection 1,000 mcg  1,000 mcg Intramuscular Q30 Days Georgina Cerna MD   1,000 mcg at 07/24/18 1313     cyanocobalamin injection 1,000 mcg  1,000 mcg Intramuscular Q30 Days Georgina Cerna MD         denosumab 60 mg (PROLIA 60 mg/ml)  60 mg Subcutaneous Q6 Months Ryan Irizarry, PharmD   60 mg at 05/22/19 1235     denosumab 60 mg (PROLIA 60 mg/ml)  60 mg Subcutaneous Once Georgina Cerna MD          Objective:   Vital Signs:   Visit Vitals  /62 (Patient Site: Left Arm, Patient Position: Sitting, Cuff Size: Adult Regular)   Pulse (!) 58   Ht 5' 7\" (1.702 m)   Wt 174 lb 7 oz (79.1 kg)   SpO2 99%   BMI 27.32 kg/m         VisionScreening:  No exam data present     PHYSICAL EXAM  EYES: Eyelids, conjunctiva, and sclera were normal. Pupils were normal. Cornea, iris, and lens were normal bilaterally.  HEAD, EARS, NOSE, MOUTH, " AND THROAT: Head and face were normal. Hearing was normal to voice and the ears were normal to external exam. Nose appearance was normal and there was no discharge. Oropharynx was normal.  TMs were normal.  NECK: Neck appearance was normal. There were no neck masses and the thyroid was not enlarged.  RESPIRATORY: Breathing pattern was normal and the chest moved symmetrically.   Lung sounds were equal bilaterally.  CARDIOVASCULAR: Heart rate and rhythm were normal.  S1 and S2 were normal and there were no extra sounds or murmurs. Peripheral pulses in arms and legs were normal.  Jugular venous pressure was normal.  There was no peripheral edema.  GASTROINTESTINAL: The abdomen was normal in contour.  Bowel sounds were present.   Palpation detected no tenderness, mass, or enlarged organs.   MUSCULOSKELETAL: Skeletal configuration was normal and muscle mass was normal for age. Joint appearance was overall normal.  LYMPHATIC: There were no enlarged nodes.  SKIN/HAIR/NAILS: Skin color was normal.  There were no abnormal skin lesions.  Hair and nails were normal.  NEUROLOGIC: The patient was alert and oriented to person, place, time, and circumstance. Speech was normal. Cranial nerves were normal. Motor strength was normal for age. The patient had an altered and unsteady gait  PSYCHIATRIC:  Mood and affect were normal and the patient had normal recent and remote memory. The patient's judgment and insight were normal.          Assessment Results 8/16/2019   Activities of Daily Living No help needed   Instrumental Activities of Daily Living No help needed   Mini Cog Total Score 2   Some recent data might be hidden     A Mini-Cog score of 0-2 suggests the possibility of dementia, score of 3-5 suggests no dementia    Identified Health Risks:     She is at risk for lack of exercise and has been provided with information to increase physical activity for the benefit of her well-being.  Patient's advanced directive was discussed  and I am comfortable with the patient's wishes.        The patient was provided with appropriate referrals to address her memory problem.

## 2021-05-31 NOTE — TELEPHONE ENCOUNTER
RN Triage:     Patient is calling in stating that she is having urination pain. Pain started 3 days ago. NO fever or flank pain, no foul smelling urine or cloudy urine. Patient has a total joint replacement. Per RN protocol she should be seen within the next 4 hours. Patient will go to the [facility] hours and location discussed.   Yadi Farrell RN, BSN Care Connection Triage Nurse      Reason for Disposition    Artificial heart valve or artificial joint    Protocols used: URINATION PAIN - FEMALE-A-AH

## 2021-05-31 NOTE — TELEPHONE ENCOUNTER
----- Message from Cheryl Buckley CNP sent at 8/14/2019  2:58 PM CDT -----  CA to do: Please inform patient that her urine culture did not show any growth.  Does not have a UTI.  She can stop her cephalexin.  If she is still having symptoms, she can follow-up with her primary care provider. Cheryl Buckley CNP

## 2021-06-01 VITALS — WEIGHT: 161 LBS | BODY MASS INDEX: 25.22 KG/M2

## 2021-06-01 VITALS — WEIGHT: 167.5 LBS | BODY MASS INDEX: 26.23 KG/M2

## 2021-06-02 NOTE — TELEPHONE ENCOUNTER
Patient calling to check on status of medication refill.  Informed patient that no updates at this time. RN unable to refill as is a controlled substance  Patient states that she is out of this medication. She states that she is leaving in one hour for a trip. She hopes that the medication could be sent on Monday to a [facility] where she will be at, [place], [place]  Patient will call on Monday to check on status.  Vanessa Barahona RN  Care Connection Triage Nurse  10:45 AM  10/26/2019

## 2021-06-02 NOTE — TELEPHONE ENCOUNTER
Controlled Substance Refill Request  Medication Name:   Requested Prescriptions     Pending Prescriptions Disp Refills     zolpidem (AMBIEN) 10 mg tablet 180 tablet 2     Sig: Take 2 tablets (20 mg total) by mouth at bedtime.     Date Last Fill: 4/17/19  [facility]: [id number]      Submit electronically to pharmacy  Controlled Substance Agreement Date Scanned:   Encounter-Level CSA Scan Date - 05/20/2016:    Scan on 5/24/2016 12:30 PM: Hydrocodone  Scan on 5/24/2016 12:29 PM: Zolpidem       Last office visit with prescriber/PCP: 5/22/2019 Georgina eCrna MD OR same dept: Visit date not found OR same specialty: Visit date not found  Last physical: 8/16/2019 Last MTM visit: Visit date not found

## 2021-06-02 NOTE — TELEPHONE ENCOUNTER
Patient stated that she will go out of town on Monday morning. Patient stated that she is out of this medication.      Controlled Substance Refill Request  Medication Name:   Requested Prescriptions     Pending Prescriptions Disp Refills     zolpidem (AMBIEN) 10 mg tablet 180 tablet 2     Sig: Take 2 tablets (20 mg total) by mouth at bedtime.     Date Last Fill: 4/17/19  [facility] #[id number]      Submit electronically to pharmacy  Controlled Substance Agreement Date Scanned:   Encounter-Level CSA Scan Date - 05/20/2016:    Scan on 5/24/2016 12:30 PM: Hydrocodone  Scan on 5/24/2016 12:29 PM: Zolpidem       Last office visit with prescriber/PCP: 5/22/2019 Georgina Cerna MD OR same dept: 5/22/2019 Georgina Cerna MD OR same specialty: 5/22/2019 Georgina Cerna MD  Last physical: 8/16/2019 Last MTM visit: Visit date not found

## 2021-06-02 NOTE — TELEPHONE ENCOUNTER
Unable to leave VM   Please inform pt that she is to early for refill and she will need to schedule med check with pcp   Rx refused

## 2021-06-02 NOTE — TELEPHONE ENCOUNTER
Prior Authorization Request  Who s requesting:  Pharmacy  Pharmacy Name and Location: [facility]  Medication Name:   zolpidem (AMBIEN) 10 mg tablet 180 tablet 2 4/17/2019  No   Sig - Route: Take 2 tablets (20 mg total) by mouth at bedtime. - Oral       Insurance Plan:   Insurance Member ID Number:  [id number]  Informed patient that prior authorizations can take up to 10 business days for response:   No  Okay to leave a detailed message: Yes

## 2021-06-02 NOTE — TELEPHONE ENCOUNTER
Attempted to contact pt.  2nd attempt. Unable to leave VM   Please inform pt that she is to early for refill and she will need to schedule med check with pcp   Rx refused

## 2021-06-02 NOTE — TELEPHONE ENCOUNTER
Central PA team  [phone]  Pool: HE PA MED ([id number])          PA has been initiated.       PA form completed and faxed insurance via Cover My Meds     Key:  [id number]       Medication:  Zolpidem Tartrate 10MG tablets      Insurance:  [employer]         Response will be received via fax and may take up to 5-10 business days depending on plan

## 2021-06-03 VITALS — BODY MASS INDEX: 27.38 KG/M2 | WEIGHT: 174.44 LBS | HEIGHT: 67 IN

## 2021-06-03 VITALS — BODY MASS INDEX: 29.19 KG/M2 | HEIGHT: 67 IN | WEIGHT: 186 LBS

## 2021-06-03 VITALS — WEIGHT: 174 LBS | BODY MASS INDEX: 27.25 KG/M2

## 2021-06-03 NOTE — TELEPHONE ENCOUNTER
Controlled Substance Refill Request  Medication Name:   Requested Prescriptions     Pending Prescriptions Disp Refills     zolpidem (AMBIEN) 10 mg tablet 180 tablet 2     Sig: Take 2 tablets (20 mg total) by mouth at bedtime.     Date Last Fill: 4/17/19  Pharmacy: [facility] in [place]      Submit electronically to pharmacy  Controlled Substance Agreement Date Scanned:   Encounter-Level CSA Scan Date - 05/20/2016:    Scan on 5/24/2016 12:30 PM: Hydrocodone  Scan on 5/24/2016 12:29 PM: Zolpidem       Last office visit with prescriber/PCP: 5/22/2019 Georgina Cerna MD OR same dept: 5/22/2019 Georgina Cerna MD OR same specialty: 5/22/2019 Georgina Cerna MD  Last physical: 8/16/2019 Last MTM visit: Visit date not found

## 2021-06-05 VITALS
WEIGHT: 186.38 LBS | HEIGHT: 67 IN | DIASTOLIC BLOOD PRESSURE: 64 MMHG | SYSTOLIC BLOOD PRESSURE: 114 MMHG | BODY MASS INDEX: 29.25 KG/M2

## 2021-06-07 NOTE — TELEPHONE ENCOUNTER
Attempted to contact pt.  Phone doesn't ring and recording comes on that says I am unable to leave a message.  Will try again later.  If pt calls, please let her know pcp sent in a prescription for Septra to her [facility] in [place].

## 2021-06-07 NOTE — TELEPHONE ENCOUNTER
Who is calling:  Patient  Reason for Call:  Patient is experiencing symptoms of UTI, patient is wanting to get something to prescribed today but she will make an appt for a lab that would be great. Patient is feeling miserable  Date of last appointment with primary care: NA  Okay to leave a detailed message: Yes

## 2021-06-07 NOTE — PROGRESS NOTES
"ASSESSMENT:  1. Dysuria  Intermittent dysuria with no cloudy colored urine, pain.  Resolves independently with fluids.  No urinary retention symptoms reported  Recommendation: Does not sound like any antibiotic treatment is needed currently.  Will leave order for urine analysis  - Urinalysis-UC if Indicated; Future    2. Bariatric surgery status  She is due for labs.  Orders placed  - Comprehensive Metabolic Panel; Future  - Vitamin B12; Future    3. Iron deficiency anemia due to chronic blood loss  Due for labs, orders placed  - Comprehensive Metabolic Panel; Future    4. Anemia, unspecified type  Status post bariatric surgery.  Will check B12 iron levels  - Comprehensive Metabolic Panel; Future  - HM2(CBC w/o Differential); Future  - Vitamin B12; Future    5. Acquired hypothyroidism  Last TSH markedly elevated.  Will update labs  - Lipid Cascade; Future  - Thyroid Stimulating Hormone (TSH); Future      Of note, after 5 minutes of the conversation, patient indicated that she had \"too many people \"in her home to discuss her medical issues.  She would like to reschedule.  Preventive Health Care:      PLAN:  There are no Patient Instructions on file for this visit.    No orders of the defined types were placed in this encounter.      No follow-ups on file.    CHIEF COMPLAINT:  Chief Complaint   Patient presents with     Dysuria     Sxs: urinary discomfort. No fever, no hematuria, no odor, etc. Completed macrobid 100 bid x 5 days on 4/20       HISTORY OF PRESENT ILLNESS:  Stefany is a 79 y.o. female calling in to the clinic today for discussion of urinary discomfort.  Of note, the patient was somewhat unusual in her affect when discussing the symptoms.  She states that she has had intermittent urinary discomfort for \"years \".  It comes and goes.  She did not report that she had been placed on Macrobid for 5 days.  She denies any difficulty with urinary retention.  She denies any fever, chills or flank pain.  She denies " "any other symptoms.    Her chart is reviewed with her.  I pointed out that she is due for labs.  She was supposed to return in a couple of months after last visit for recheck on her TSH which was markedly elevated.  She did not do that.    She states that she is doing fine with the shelter in place and feels that she is \"relatively healthy \".    REVIEW OF SYSTEMS:     All other systems are negative.    PFSH:  She is .  She would like to leave her  and rent a small apartment    TOBACCO USE:  Social History     Tobacco Use   Smoking Status Never Smoker   Smokeless Tobacco Never Used       VITALS:  Stated Blood Pressure    Stated Pulse    Stated Weight     PHYSICAL EXAM:  (observations via phone)  Conversation is unusual.  I introduced myself.  She has no need for many years.  After approximately 5 to 6 minutes, she abruptly terminated the visit and stated that she had \"too many people at her house to complete the office visit \".  She will reschedule.      ADDITIONAL HISTORY SUMMARIZED (2): Reviewed recent history as I have not seen her since August 2019  CARE EVERYWHERE/ EXTRA INFORMATION (1):   RADIOLOGY TESTS (1):   LABS (1): Ordered  CARDIOLOGY/MEDICINE TESTS (1):   INDEPENDENT REVIEW (2 each):     Total data points:      The visit lasted a total of 6 minutes   CA intake time  4 minutes    Telephone Consent was completed by  staff and confirmed by CA    I have reviewed and updated the patient's Past Medical History, Social History, Family History as appropriate.    MEDICATIONS: Reviewed and updated per CA and MD  Current Outpatient Medications   Medication Sig Dispense Refill     levothyroxine (SYNTHROID, LEVOTHROID) 125 MCG tablet TAKE 1 TABLET BY MOUTH EVERY DAY 90 tablet 3     zolpidem (AMBIEN) 10 mg tablet Take 2 tablets (20 mg total) by mouth at bedtime. 180 tablet 2     Current Facility-Administered Medications   Medication Dose Route Frequency Provider Last Rate Last Dose     " "cyanocobalamin injection 1,000 mcg  1,000 mcg Intramuscular Q30 Days Georgina Cerna MD   1,000 mcg at 07/24/18 1313     cyanocobalamin injection 1,000 mcg  1,000 mcg Intramuscular Q30 Days Georgina Cerna MD   1,000 mcg at 08/16/19 1316     denosumab 60 mg (PROLIA 60 mg/ml)  60 mg Subcutaneous Once Georgina Cerna MD               The patient has been notified of following:     \"This telephone visit will be conducted via a call between you and your physician/provider. We have found that certain health care needs can be provided without the need for a physical exam.  This service lets us provide the care you need with a short phone conversation.  If a prescription is necessary we can send it directly to your pharmacy.  If lab work is needed we can place an order for that and you can then stop by our lab to have the test done at a later time.    If during the course of the call the physician/provider feels a telephone visit is not appropriate, you will not be charged for this service.\"     "

## 2021-06-07 NOTE — TELEPHONE ENCOUNTER
I did a virtual visit and she weirdly said she had to reschedule for another time.  Did she mention that?  I will order a medicine.  .

## 2021-06-08 NOTE — PROGRESS NOTES
"[facility] Patient Outreach:     Discharge Date::  1-      Reason for TCU stay (discharge diagnosis)::  L total knee replacement       Are you feeling better, the same or worse since your discharge?:  Patient is feeling the same          As part of your discharge plan, did they discuss home care with you?: Yes        Have your seen them yet, or are they scheduled to visit?: No        Did you receive any new medications, or was there a change to your medications?: No            Do you have any follow up visits scheduled with your PCP or Specialist?:  No          I'm glad to hear you're doing well and we want you to continue to do well. Your PCP would like to see you for a follow-up visit. Can we help set that up for your today?: No (pt reports has constipation \" advised to make appointment with her md \" pt refused, reports \"homecare nurse coming tomorrow and she will have homecare leave message for her md\" (number left with pt)        (RN) Provided patient the PCP's phone number to call if they have any questions or concerns?: Yes            "

## 2021-06-08 NOTE — PROGRESS NOTES
[facility]    Facility:   [facility] [[id number]]   Code Status: FULL CODE      CHIEF COMPLAINT/REASON FOR VISIT:  Chief Complaint   Patient presents with     Follow Up     knee, pain       HISTORY:      HPI: Stefany is a 76 y.o. female Who I had a chance to revisit with secondary to her left total knee. She has been able to ambulate up and down the hallway with a walker for therapy recommendations. She does not like the upper arm in body and shoulder workouts she states that it does seem to conflict with her chronic neck and back pain. I told her bring it up with therapy staff. Is still having periods of constipation we again discussed sorbitol she like to try it will give that a shot. She also needs a refill of her Vicodin. Appetite good. The knee looks good she does use ice packs. Has been normotensive and afebrile.    Past Medical History   Diagnosis Date     DJD (degenerative joint disease)              No family history on file.  Social History     Social History     Marital status:      Spouse name: N/A     Number of children: N/A     Years of education: N/A     Social History Main Topics     Smoking status: Never Smoker     Smokeless tobacco: Not on file     Alcohol use No     Drug use: No     Sexual activity: Not on file     Other Topics Concern     Not on file     Social History Narrative         Review of Systems  She denies any chills or fever coughing wheezing chest pain dizziness vertigo nausea vomiting diarrhea dysuria rashes or stiff neck swollen glands or headaches.      Current Outpatient Prescriptions:      acetaminophen (TYLENOL) 500 MG tablet, Take 500-1,000 mg by mouth every 6 (six) hours as needed for pain. Do not exceed 4000 mg in 24 hours, Disp: , Rfl:      aspirin 325 MG tablet, Take 325 mg by mouth 2 (two) times a day. , Disp: , Rfl:      fluticasone (FLONASE) 50 mcg/actuation nasal spray, 2 sprays into each nostril bedtime., Disp: , Rfl:       levothyroxine (SYNTHROID, LEVOTHROID) 125 MCG tablet, TAKE 1 TABLET BY MOUTH EVERY DAY, Disp: 90 tablet, Rfl: 3     ondansetron (ZOFRAN-ODT) 8 MG disintegrating tablet, Take 8 mg by mouth every 6 (six) hours as needed for nausea., Disp: , Rfl:      senna-docusate (SENNOSIDES-DOCUSATE SODIUM) 8.6-50 mg tablet, Take 1-2 tablets by mouth bedtime as needed for constipation., Disp: , Rfl:      VITAMIN D2 50,000 unit capsule, TAKE 1 CAPSULE BY MOUTH 1 TIME EVERY WEEK, Disp: 13 capsule, Rfl: 0     zolpidem (AMBIEN) 10 mg tablet, TAKE 2 TABLETS BY MOUTH EVERY NIGHT AT BEDTIME, Disp: 180 tablet, Rfl: 1    Current Facility-Administered Medications:      cyanocobalamin injection 1,000 mcg, 1,000 mcg, Intramuscular, Q30 Days, Georgina Cerna MD, 1,000 mcg at 10/07/16 1426     denosumab 60 mg (PROLIA 60 mg/ml), 60 mg, Subcutaneous, Q6 Months, Georgina Cerna MD, 60 mg at 11/02/16 1044    .  Vitals:    01/10/17 1200   BP: 125/67   Pulse: 68   Resp: 16   Temp: 97.6  F (36.4  C)   SpO2: 99%       Physical Exam  Constitutional: She is oriented to person, place, and time. She appears well-developed and well-nourished. No distress.   HENT:   Head: Normocephalic.   Eyes: Pupils are equal, round, and reactive to light.   Neck: Neck supple. No thyromegaly present.   Cardiovascular: Normal rate, regular rhythm and normal heart sounds.   Pulmonary/Chest: Breath sounds normal.   Abdominal: Bowel sounds are normal. There is no tenderness. There is no guarding.   Musculoskeletal:   Left knee with normal amounts of swelling the scar looks good popliteal pulse positive. Mars oshea. Able use a walker.   Lymphadenopathy:   She has no cervical adenopathy.   Neurological: She is oriented to person, place, and time.   Skin: Skin is warm and dry. No rash noted.   Psychiatric: Her behavior is normal.     LABS:   Results for orders placed or performed in visit on 04/03/15   Basic Metabolic Panel   Result Value Ref Range    Sodium 146 (H) 136 -  145 mmol/L    Potassium 4.9 3.5 - 5.0 mmol/L    Chloride 113 (H) 98 - 107 mmol/L    CO2 24 22 - 31 mmol/L    Anion Gap, Calculation 9 5 - 18 mmol/L    Glucose 96 70 - 125 mg/dL    Calcium 9.0 8.5 - 10.5 mg/dL    BUN 12 8 - 28 mg/dL    Creatinine 0.72 0.60 - 1.10 mg/dL    GFR MDRD Af Amer >60 >60 mL/min/1.73m2    GFR MDRD Non Af Amer >60 >60 mL/min/1.73m2       Lab Results   Component Value Date    HGB 11.6 (L) 07/19/2016         ASSESSMENT:      ICD-10-CM    1. Total knee replacement status Z96.659    2. Pain management R52    3. Esophageal reflux K21.9    4. Constipation K59.00        PLAN:    Adding sorbitol. Also before last week and occasionally she was having some evening or nighttime muscular spasms to her left leg area we did add Flexeril PRN at night she is not used any.    .    Electronically signed by: Michael Duane Johnson, CNP

## 2021-06-08 NOTE — TELEPHONE ENCOUNTER
Refill Approved    Rx renewed per Medication Renewal Policy. Medication was last renewed on 4/24/19.    Yi Urena, [facility] Triage/Med Refill 5/13/2020     Requested Prescriptions   Pending Prescriptions Disp Refills     levothyroxine (SYNTHROID, LEVOTHROID) 125 MCG tablet [Pharmacy Med Name: LEVOTHYROXINE 0.125MG (125MCG) TAB] 90 tablet 3     Sig: TAKE 1 TABLET BY MOUTH EVERY DAY       Thyroid Hormones Protocol Passed - 5/11/2020 12:28 PM        Passed - Provider visit in past 12 months or next 3 months     Last office visit with prescriber/PCP: Visit date not found OR same dept: 5/22/2019 Georgina Cerna MD OR same specialty: 5/22/2019 Georgina Cerna MD  Last physical: Visit date not found Last MTM visit: Visit date not found   Next visit within 3 mo: Visit date not found  Next physical within 3 mo: Visit date not found  Prescriber OR PCP: Carlos Alberto Gamez DO  Last diagnosis associated with med order: 1. Hypothyroidism (acquired)  - levothyroxine (SYNTHROID, LEVOTHROID) 125 MCG tablet [Pharmacy Med Name: LEVOTHYROXINE 0.125MG (125MCG) TAB]; TAKE 1 TABLET BY MOUTH EVERY DAY  Dispense: 90 tablet; Refill: 3    If protocol passes may refill for 12 months if within 3 months of last provider visit (or a total of 15 months).             Passed - TSH on file in past 12 months for patient age 12 & older     TSH   Date Value Ref Range Status   08/16/2019 63.83 (H) 0.30 - 5.00 uIU/mL Final

## 2021-06-08 NOTE — PROGRESS NOTES
[facility]      [facility] [[id number][facility]  Code Status: FULL CODE      Chief Complaint/Reason for Visit:  Chief Complaint   Patient presents with     H & P       HPI:   Stefany is a 76 y.o. female who went for elective TKA L  At the [facility] on 12/27/16. This was done sec to end stage OA failed conservative management  No perioperative complications noted,   She was placed on BID regular ASA for DVT prophylaxis  Currently on Oxycodone for pain. Not helping much. Tramadol is not effective at all  Doing well initially with therapy, however yesterday felt pain on lateral L ankle. More swollen as well  Also complains of pain behind L knee going down calf.    Has not had a BM since day after surgery. Fees bloated.   No urinar y complaints  No nausea no vomiting,   No cough , no SOB.       Past Medical History:  No past medical history on file.      No past medical history on file.  hypothyroid  Surgical History:  Past Surgical History   Procedure Laterality Date     Pr total hip arthroplasty       Description: Total Hip Replacement;  Recorded: 05/08/2009;  Comments: left     Pr removal gallbladder       Description: Cholecystectomy;  Recorded: 05/08/2009;     Pr appendectomy       Description: Appendectomy;  Recorded: 05/08/2009;     Pr total abdom hysterectomy       Description: Total Abdominal Hysterectomy;  Recorded: 05/08/2009;     Pr gastric bypass,obese<150cm tiffany-en-y       Description: Gastric Surgery For Morbid Obesity Bypass With Tiffany-en-Y;  Recorded: 01/22/2013;     Pr total hip arthroplasty       Description: Total Hip Replacement;  Recorded: 04/16/2013;       Family History:   No family history on file.    Social History:    Social History     Social History     Marital status:      Spouse name: N/A     Number of children: N/A     Years of education: N/A     Social History Main Topics     Smoking status: Never Smoker     Smokeless tobacco: Not  on file     Alcohol use No     Drug use: No     Sexual activity: Not on file     Other Topics Concern     Not on file     Social History Narrative          Review of Systems  As above  All others are negative  There were no vitals filed for this visit.    Physical Exam  VS noted and stable  Patient is alert, awake, oriented to time, place and person, not in acute cardiorespiratory distress  Skin: Warm, and moist,  no lesions,   Head: atraumatic, normocephalic,   Eyes: EOM's intact and conjugate, pink palpebral conjunctivae, anicteric sclerae, pupils reactive  Lungs : Clear to auscultation , no crackles, wheezes or rhonchi  Heart : Nornal first and second heart sounds, No murmurs, heaves, or thrills  Abdomen: Soft, non tender, non distended, no hepatosplenomegaly, no ascites (+) slow bowel sounds  Extremities: (+) popliteal tenderness on L, (+) localized calf and ankle  Edema L , pulses are full and equal      Medication List:  Current Outpatient Prescriptions   Medication Sig     acetaminophen (TYLENOL) 500 MG tablet Take 500-1,000 mg by mouth every 6 (six) hours as needed for pain. Do not exceed 4000 mg in 24 hours     aspirin 325 MG tablet Take 325 mg by mouth 2 (two) times a day.      fluticasone (FLONASE) 50 mcg/actuation nasal spray 2 sprays into each nostril bedtime.     levothyroxine (SYNTHROID, LEVOTHROID) 125 MCG tablet TAKE 1 TABLET BY MOUTH EVERY DAY     ondansetron (ZOFRAN-ODT) 8 MG disintegrating tablet Take 8 mg by mouth every 6 (six) hours as needed for nausea.     oxyCODONE (ROXICODONE) 5 MG immediate release tablet Take 5-10 mg by mouth every 4 (four) hours as needed for pain.     senna-docusate (SENNOSIDES-DOCUSATE SODIUM) 8.6-50 mg tablet Take 1-2 tablets by mouth bedtime as needed for constipation.     traMADol (ULTRAM) 50 mg tablet Take 50 mg by mouth every 6 (six) hours as needed for pain.     VITAMIN D2 50,000 unit capsule TAKE 1 CAPSULE BY MOUTH 1 TIME EVERY WEEK     zolpidem (AMBIEN) 10 mg  tablet TAKE 2 TABLETS BY MOUTH EVERY NIGHT AT BEDTIME       Labs:  No results found for this or any previous visit (from the past 72 hour(s)).      Assessment:    ICD-10-CM    1. S/P total knee arthroplasty Z96.659     left     2. Hypothyroidism E03.9    3. Constipation K59.00    4. Ankle swelling, right M25.471    5. Pain of left calf M79.662        Plan:  PT/OT  Continue thyroid supplement  COncern for DVT, with popliteal tenderness and swelling. Check US to rule out  ICe on knee, and ankle  Uncontrolled pain, change pain med from oxy to vicodin. DC tramadol as not helpful  Increase senna to BID, offer prune juice for constipation.   Total time spent was 60 minutes with more than 50% spent on counseling, discussion of treatment plan and extensive review of available records        Electronically signed by: Semaj Morrow MD

## 2021-06-08 NOTE — PROGRESS NOTES
[facility] Follow Up Note    Assessment/Plan:  1. Status post total left knee replacement  She is one week out of transitional care.  Receiving in-home physical therapy.  Progressing as expected with regard to the knee  - HM2(CBC w/o Differential)  - Comprehensive Metabolic Panel    2. Constipation  This is the biggest obstacle to improvement.  Constipation has been severe.  Some response with Dulcolax suppositories  Recommendation: Docusate 100 mg twice a day, MiraLAX-17 g daily, magnesium 200-300 mg daily plus the addition of smooth move tea.  She will contact us if this does not help    3. Acquired hypothyroidism  We'll check labs to ensure that hypothyroidism is not a contributing cause to constipation  - Thyroid Stimulating Hormone (TSH)    4. Vitamin B12 Deficiency  I'll update lab work          Georgina Cerna MD    Chief Complaint:  Chief Complaint   Patient presents with     Follow-up       History of Present Illness:  Stefany is a 76 y.o. female who is here today for hospital and transitional care discharge follow-up.  Of note, she had a left total knee replacement at the [facility].  With respect to the knee, this went as well as could be expected.  She is progressing uneventfully in terms of recovery from her knee surgery.  She still has some swelling.  She is undergoing home physical therapy.  Her pain is under fairly good control.  She is ambulatory.    Her biggest obstacle to recovery has been constipation that has occurred.  She did get some response with the Dulcolax suppositories  recommended by me last week.  Reference to care connection notes.  She is currently taking a daily stool softener along with MiraLAX.  She states that her meal plan at [facility] for transitional care was poor.  She states they served nothing but starchy foods like macaroni and noodle dishes.  Now that she is home, she has resumed eating vegetables and fruit.  This should help.  She believes her fluid intake  is up to speed.  Her physical activity is still low.    Review of Systems:  A comprehensive review of systems was performed and was otherwise negative    PFSH:  Social History: She is .  Her adopted son brought a large pitbull into their home.  She states he is nice to her but mean to any visitor.  History   Smoking Status     Never Smoker   Smokeless Tobacco     Not on file       Past History: Significant for bariatric surgery, multiple areas of arthritis  Current Outpatient Prescriptions   Medication Sig Dispense Refill     acetaminophen (TYLENOL) 500 MG tablet Take 500-1,000 mg by mouth every 6 (six) hours as needed for pain. Do not exceed 4000 mg in 24 hours       aspirin 325 MG tablet Take 325 mg by mouth 2 (two) times a day.        cyclobenzaprine (FLEXERIL) 10 MG tablet Take 1 tablet (10 mg total) by mouth bedtime as needed for muscle spasms. 30 tablet 0     fluticasone (FLONASE) 50 mcg/actuation nasal spray 2 sprays into each nostril bedtime.       levothyroxine (SYNTHROID, LEVOTHROID) 125 MCG tablet TAKE 1 TABLET BY MOUTH EVERY DAY 90 tablet 3     senna-docusate (SENNOSIDES-DOCUSATE SODIUM) 8.6-50 mg tablet Take 1-2 tablets by mouth bedtime as needed for constipation.       VITAMIN D2 50,000 unit capsule TAKE 1 CAPSULE BY MOUTH 1 TIME EVERY WEEK 13 capsule 0     zolpidem (AMBIEN) 10 mg tablet TAKE 2 TABLETS BY MOUTH EVERY NIGHT AT BEDTIME 180 tablet 1     Current Facility-Administered Medications   Medication Dose Route Frequency Provider Last Rate Last Dose     cyanocobalamin injection 1,000 mcg  1,000 mcg Intramuscular Q30 Days Georgina Cerna MD   1,000 mcg at 01/31/17 1246     denosumab 60 mg (PROLIA 60 mg/ml)  60 mg Subcutaneous Q6 Months Georgina Cerna MD   60 mg at 11/02/16 1044       Family History: Nothing new    Physical Exam:  General Appearance:   She appears well and in no acute distress.  Her weight is stable  Vitals:    01/31/17 1154   BP: 132/68   Patient Site: Left Arm    Patient Position: Sitting   Cuff Size: Adult Regular   Pulse: 78   Weight: 164 lb 14.4 oz (74.8 kg)     Wt Readings from Last 3 Encounters:   01/31/17 164 lb 14.4 oz (74.8 kg)   10/07/16 165 lb (74.8 kg)   07/19/16 164 lb (74.4 kg)     Body mass index is 25.83 kg/(m^2).    Head and neck exam is unremarkable  Lungs are clear to auscultation and percussion  Cardiac exam reveals a regular rate and rhythm with no murmurs or gallops  Lower extremities are examined.  No calf tenderness is noted

## 2021-06-08 NOTE — PROGRESS NOTES
[facility]    Facility:   [facility] [[id number]]   Code Status: FULL CODE      CHIEF COMPLAINT/REASON FOR VISIT:  Chief Complaint   Patient presents with     Follow Up     knee, rehab       HISTORY:      HPI: Stefany is a 76 y.o. female Who I had a chance to visit with secondary to her hospitalization December 27 secondary to left knee degenerative joint disease end-stage status post total knee arthroplasty. The scar actually looks good there is some swelling but that is decreasing. I have observed her ambulate up and down the hallway a couple of different times and she actually did a pretty good job today with her walker. The pain seems to be controlled. Recently the narcotics were changed and is currently on Vicodin is PRN rather than the tramadol or the oxycodone in the Tylenol is increased thousand milligrams three times a day. At night though she is having some muscular spasms in the leg and hip area we talked about that and she is had pretty good success in the past using Flexeril's will give her one dose 10 mg as PRN at night for the muscular spasms. Recently did have some left leg swelling the DVT and the ultrasound Rall negative. She has been normotensive and afebrile and also on room air. Appetite has been good and usually likes to eat a lot of fruits and vegetables.    Past Medical History   Diagnosis Date     DJD (degenerative joint disease)              No family history on file.  Social History     Social History     Marital status:      Spouse name: N/A     Number of children: N/A     Years of education: N/A     Social History Main Topics     Smoking status: Never Smoker     Smokeless tobacco: Not on file     Alcohol use No     Drug use: No     Sexual activity: Not on file     Other Topics Concern     Not on file     Social History Narrative         Review of Systems  She denies any chills or fever coughing wheezing chest pain dizziness vertigo  nausea vomiting diarrhea dysuria rashes or stiff neck swollen glands or headaches.    Current Outpatient Prescriptions   Medication Sig     acetaminophen (TYLENOL) 500 MG tablet Take 500-1,000 mg by mouth every 6 (six) hours as needed for pain. Do not exceed 4000 mg in 24 hours     aspirin 325 MG tablet Take 325 mg by mouth 2 (two) times a day.      fluticasone (FLONASE) 50 mcg/actuation nasal spray 2 sprays into each nostril bedtime.     levothyroxine (SYNTHROID, LEVOTHROID) 125 MCG tablet TAKE 1 TABLET BY MOUTH EVERY DAY     ondansetron (ZOFRAN-ODT) 8 MG disintegrating tablet Take 8 mg by mouth every 6 (six) hours as needed for nausea.     senna-docusate (SENNOSIDES-DOCUSATE SODIUM) 8.6-50 mg tablet Take 1-2 tablets by mouth bedtime as needed for constipation.     VITAMIN D2 50,000 unit capsule TAKE 1 CAPSULE BY MOUTH 1 TIME EVERY WEEK     zolpidem (AMBIEN) 10 mg tablet TAKE 2 TABLETS BY MOUTH EVERY NIGHT AT BEDTIME       .  Vitals:    01/06/17 1324   BP: 134/77   Pulse: 77   Resp: 18   Temp: 98.1  F (36.7  C)   SpO2: 100%       Physical Exam   Constitutional: She is oriented to person, place, and time. She appears well-developed and well-nourished. No distress.   HENT:   Head: Normocephalic.   Eyes: Pupils are equal, round, and reactive to light.   Neck: Neck supple. No thyromegaly present.   Cardiovascular: Normal rate, regular rhythm and normal heart sounds.    Pulmonary/Chest: Breath sounds normal.   Abdominal: Bowel sounds are normal. There is no tenderness. There is no guarding.   Musculoskeletal:   Left knee with normal amounts of swelling the scar looks good popliteal pulse positive. Mars hose. Able use a walker.   Lymphadenopathy:     She has no cervical adenopathy.   Neurological: She is oriented to person, place, and time.   Skin: Skin is warm and dry. No rash noted.   Psychiatric: Her behavior is normal.         LABS:   Results for orders placed or performed in visit on 04/03/15   Basic Metabolic Panel    Result Value Ref Range    Sodium 146 (H) 136 - 145 mmol/L    Potassium 4.9 3.5 - 5.0 mmol/L    Chloride 113 (H) 98 - 107 mmol/L    CO2 24 22 - 31 mmol/L    Anion Gap, Calculation 9 5 - 18 mmol/L    Glucose 96 70 - 125 mg/dL    Calcium 9.0 8.5 - 10.5 mg/dL    BUN 12 8 - 28 mg/dL    Creatinine 0.72 0.60 - 1.10 mg/dL    GFR MDRD Af Amer >60 >60 mL/min/1.73m2    GFR MDRD Non Af Amer >60 >60 mL/min/1.73m2       Lab Results   Component Value Date    HGB 11.6 (L) 07/19/2016         ASSESSMENT:      ICD-10-CM    1. Total knee replacement status Z96.659    2. Constipation K59.00    3. Pain management R52    4. Muscle spasm M62.838        PLAN:    Adding Flexeril at night 10 mg is PRN otherwise continue to follow up with therapy she's doing pretty good in making pretty good gains overall with her therapy and so therefore staff will keep us updated for any other issues but otherwise possibly discharge within a week or so.    .    Electronically signed by: Michael Duane Johnson, CNP

## 2021-06-08 NOTE — PROGRESS NOTES
[facility]    [facility] [[id number][facility]   Code Status: FULL CODE  PCP: Georgina Cerna MD   Phone: [phone]   Fax: [phone]      CHIEF COMPLAINT/REASON FOR VISIT:  Chief Complaint   Patient presents with     Discharge Summary       HISTORY COURSE:  Stefany is a 76 y.o. female who went for elective TKA L At the [facility] on 12/27/16. This was done sec to end stage OA failed conservative management  No perioperative complications noted,   She was placed on BID regular ASA for DVT prophylaxis  Currently on Oxycodone for pain. Not helping much. Tramadol is not effective at all    She actually has done fairly well from a therapy standpoint in that she has been able to use a two wheeled walker without any problems and currently now does have her own for home use. For pain she is on Tylenol along with Vicodin with seems to be holding her over. Sometimes at night she was having some muscular spasm issues and therefore did take a Flexeril. Having issues a constipation with a chance to address that as well that should probably resolve itself when she's off her narcotics we did need to add sorbitol. Does not like prune juice. We also talked about home remedies. The left knee actually looks to be in pretty good shape no complications or infection. Appetite good normotensive and afebrile.      Review of Systems  She denies any chills or fever coughing wheezing chest pain dizziness vertigo nausea vomiting diarrhea dysuria rashes or stiff neck swollen glands or headaches.     Vitals:    01/13/17 1151   BP: 127/72   Pulse: 67   Resp: 18   Temp: 98.8  F (37.1  C)   SpO2: 98%       Physical Exam  Constitutional: She is oriented to person, place, and time. She appears well-developed and well-nourished. No distress.   HENT:   Head: Normocephalic.   Eyes: Pupils are equal, round, and reactive to light.   Neck: Neck supple. No thyromegaly present.   Cardiovascular: Normal  rate, regular rhythm and normal heart sounds.   Pulmonary/Chest: Breath sounds normal.   Abdominal: Bowel sounds are normal. There is no tenderness. There is no guarding.   Musculoskeletal:   Left knee with normal amounts of swelling the scar looks good popliteal pulse positive. Mars hose. Able use a walker.   Lymphadenopathy:   She has no cervical adenopathy.   Neurological: She is oriented to person, place, and time.   Skin: Skin is warm and dry. No rash noted.   Psychiatric: Her behavior is normal.    Results for orders placed or performed in visit on 04/03/15   Basic Metabolic Panel   Result Value Ref Range    Sodium 146 (H) 136 - 145 mmol/L    Potassium 4.9 3.5 - 5.0 mmol/L    Chloride 113 (H) 98 - 107 mmol/L    CO2 24 22 - 31 mmol/L    Anion Gap, Calculation 9 5 - 18 mmol/L    Glucose 96 70 - 125 mg/dL    Calcium 9.0 8.5 - 10.5 mg/dL    BUN 12 8 - 28 mg/dL    Creatinine 0.72 0.60 - 1.10 mg/dL    GFR MDRD Af Amer >60 >60 mL/min/1.73m2    GFR MDRD Non Af Amer >60 >60 mL/min/1.73m2       Lab Results   Component Value Date    HGB 11.6 (L) 07/19/2016       MEDICATION LIST:  Current Outpatient Prescriptions   Medication Sig     cyclobenzaprine (FLEXERIL) 10 MG tablet Take 10 mg by mouth bedtime as needed for muscle spasms.     acetaminophen (TYLENOL) 500 MG tablet Take 500-1,000 mg by mouth every 6 (six) hours as needed for pain. Do not exceed 4000 mg in 24 hours     aspirin 325 MG tablet Take 325 mg by mouth 2 (two) times a day.      fluticasone (FLONASE) 50 mcg/actuation nasal spray 2 sprays into each nostril bedtime.     levothyroxine (SYNTHROID, LEVOTHROID) 125 MCG tablet TAKE 1 TABLET BY MOUTH EVERY DAY     senna-docusate (SENNOSIDES-DOCUSATE SODIUM) 8.6-50 mg tablet Take 1-2 tablets by mouth bedtime as needed for constipation.     VITAMIN D2 50,000 unit capsule TAKE 1 CAPSULE BY MOUTH 1 TIME EVERY WEEK     zolpidem (AMBIEN) 10 mg tablet TAKE 2 TABLETS BY MOUTH EVERY NIGHT AT BEDTIME       DISCHARGE DIAGNOSIS:     ICD-10-CM    1. Total knee replacement status Z96.659    2. Hypothyroidism E03.9    3. Constipation K59.00    4. Osteoarthritis of multiple joints M15.9        MEDICAL EQUIPMENT NEEDS:  walker    DISCHARGE PLAN/FACE TO FACE:  I certify that this patient is under my care and that I, or a nurse practitioner or physician's assistant working with me, had a face-to-face encounter that meets the physician face-to-face encounter requirements with this patient.   Date of Face-to-Face Encounter: January 13, 2017    I certify that, based on my findings, the following services are medically necessary home health services: Discharging to home with current medications and narcotics with [facility] homecare services for physical therapy and nursing.     My clinical findings support the need for the above skilled services because: (Please write a brief narrative summary that describes what the RN, PT, SLP, or other services will be doing in the home. A list of diagnoses in this section does not meet the CMS requirements.) Secondary to generalized debility and medication management    This patient is homebound because: (Please write a brief narrative summary describing the functional limitations as to why this patient is homebound and specifically what makes this patient homebound.) Secondary generalized debility    The patient is, or has been, under my care and I have initiated the establishment of the plan of care. This patient will be followed by a physician who will periodically review the plan of care.  She will follow up with orthopedics I believe the next visit is March 7. She also fall for their family practice doctor regarding medication management and any future laboratory studies. She does feel comfortable with the stay on the transitional care unit and did feel that there was a positive experience. Shall try to wean down some of the narcotics.      Discharge coordination care greater than 30 minutes.    Electronically  signed by: Michael Duane Johnson, CNP

## 2021-06-10 NOTE — TELEPHONE ENCOUNTER
Controlled Substance Refill Request  Medication Name:   Requested Prescriptions     Pending Prescriptions Disp Refills     zolpidem (AMBIEN) 10 mg tablet [Pharmacy Med Name: ZOLPIDEM 10MG TABLETS] 180 tablet 0     Sig: TAKE 2 TABLETS BY MOUTH DAILY AT BEDTIME     Date Last Fill: 11/10/19  [facility]  Submit electronically to pharmacy  Controlled Substance Agreement on file:   Encounter-Level CSA Scan Date - 05/20/2016:    Scan on 5/24/2016 12:30 PM: Hydrocodone  Scan on 5/24/2016 12:29 PM: Zolpidem        Last office visit:  4/29/20

## 2021-06-13 NOTE — TELEPHONE ENCOUNTER
Controlled Substance Refill Request  Medication Name:   Requested Prescriptions     Pending Prescriptions Disp Refills     zolpidem (AMBIEN) 10 mg tablet [Pharmacy Med Name: ZOLPIDEM 10MG TABLETS] 180 tablet 0     Sig: TAKE 2 TABLETS BY MOUTH DAILY AT BEDTIME     Date Last Fill: 7/31/20  [facility]  Submit electronically to pharmacy  Controlled Substance Agreement on file:   Encounter-Level CSA Scan Date - 05/20/2016:    Scan on 5/24/2016 12:30 PM: Hydrocodone  Scan on 5/24/2016 12:29 PM: Zolpidem        Last office visit:  4/29/20

## 2021-06-14 ENCOUNTER — RECORDS - HEALTHEAST (OUTPATIENT)
Dept: ADMINISTRATIVE | Facility: CLINIC | Age: 81
End: 2021-06-14

## 2021-06-15 PROBLEM — K59.00 CONSTIPATION: Status: ACTIVE | Noted: 2017-01-06

## 2021-06-15 PROBLEM — Z96.659 TOTAL KNEE REPLACEMENT STATUS: Status: ACTIVE | Noted: 2017-01-06

## 2021-06-15 PROBLEM — R52 PAIN MANAGEMENT: Status: ACTIVE | Noted: 2017-01-06

## 2021-06-16 PROBLEM — D50.0 IRON DEFICIENCY ANEMIA DUE TO CHRONIC BLOOD LOSS: Status: ACTIVE | Noted: 2019-08-16

## 2021-06-16 PROBLEM — Z98.84 BARIATRIC SURGERY STATUS: Status: ACTIVE | Noted: 2019-08-16

## 2021-06-16 NOTE — TELEPHONE ENCOUNTER
Telephone Encounter by Zoe Crabtree at 10/16/2019 10:53 AM     Author: Zoe Crabtree Service: -- Author Type: --    Filed: 10/16/2019 10:53 AM Encounter Date: 10/10/2019 Status: Signed    : Zoe Crabtree APPROVED:    Approval start date:4/17/19  Approval end date:10/15/2020    Pharmacy has been notified of approval and will contact patient when medication is ready for pickup.

## 2021-06-16 NOTE — TELEPHONE ENCOUNTER
Per PCP she wanted the script cancelled   Cancelled rx with pharmacist Ross   This will be discussed at appointment tomorrow

## 2021-06-16 NOTE — TELEPHONE ENCOUNTER
Reason for Call:  Other call back      Detailed comments: wondering if she can PLEASE talk to Dr Cerna    Regarding her Mom-she quit taking her meds for 2.5 years and levels were off balance she ended up in the hospital.  And also not taking b12 shot-signs of dementia.  She would like to know if it is perm damage to her since she didn't take them for so long.    Please call her and talk to her.  She doesn't want her mom to know about this call.    Phone Number Patient can be reached at: Other phone number:  [phone]    Best Time: any    Can we leave a detailed message on this number?: Yes    Call taken on 4/20/2021 at 3:31 PM by Pamela Behr

## 2021-06-16 NOTE — TELEPHONE ENCOUNTER
Reason for Call:  Medication or medication refill:    Do you use a [facility]?  Name of the pharmacy and phone number for the current request: [facility]    Name of the medication requested:   States that in the hospital-med was increased they were taking double the amount of 50mcgs ? She wasn't sure just knows it was doubled.    Please send in refill for new dosage please  levothyroxine (SYNTHROID, LEVOTHROID) 25 MCG tablet     No   Sig - Route: Take 1 tablet (25 mcg total) by mouth daily. - Oral         Other request:  NA-daughter is going out of town and hoing to get her bottles filled proir to that.    Can we leave a detailed message on this number? Yes    Phone number patient can be reached at: Home number on file [phone] (home)    Best Time: any    Call taken on 4/19/2021 at 8:46 AM by Pamela Behr

## 2021-06-16 NOTE — TELEPHONE ENCOUNTER
Antonella is calling back stating that she did talk to a dr friend of hers and she got answers so no call back is needed.

## 2021-06-16 NOTE — TELEPHONE ENCOUNTER
Reason for Call:  Other call back      Detailed comments: PT HAS AN APPT WITH DR MAYO ON 03/24/2021 -   WANTING TO KNOW WHY AMBIEN WAS DENIED    PLEASE CALL DAUGHTER    Phone Number Patient can be reached at: Other phone number:  [phone]    Best Time:  ANYTIME    Can we leave a detailed message on this number?: Yes    Call taken on 3/22/2021 at 3:28 PM by Jackeline Kothari

## 2021-06-16 NOTE — TELEPHONE ENCOUNTER
Call Stefany and her daughter and let them know that I am changing thyroid dose to one 75mcg tab of levothyroxine daily for the next month.  We will need follow up before end of the next 30 days

## 2021-06-16 NOTE — TELEPHONE ENCOUNTER
Reason for Call:  Medication or medication refill:    Do you use a [facility]?  Name of the pharmacy and phone number for the current request[facility]    Name of the medication requested: needles/syringes for her b12 med    Other request: na-    Can we leave a detailed message on this number? Yes    Phone number patient can be reached at: Home number on file [phone] (home)    Best Time: any    Call taken on 4/16/2021 at 9:03 AM by Pamela Behr

## 2021-06-16 NOTE — TELEPHONE ENCOUNTER
Telephone Encounter by Marlene Keller CMA at 4/15/2019  2:08 PM     Author: Marlene Keller CMA Service: -- Author Type: Certified Medical Assistant    Filed: 4/15/2019  2:14 PM Encounter Date: 4/15/2019 Status: Signed    : Marlene Keller CMA (Certified Medical Assistant)       Medication: Zolpidem   Last Date Filled 12/11/18   pulled: YES    Only PCP Prescribing? : YES  Taken as prescribed from physician notes? YES    CSA in last year: NO  Random Utox in last year: Not needed  (if any of the above answer NO - schedule with PCP)     Opioids + benzodiazepines? NO  (if the above answer YES - schedule with PCP every 6 months)     All responses suggest: Scheduling with PCP for further intervention

## 2021-06-16 NOTE — PROGRESS NOTES
[facility] Follow Up Note    Assessment/Plan:  1. Hospital discharge follow-up  Complex hospitalization for fall and obtundation following complete noncompliance with medications and profound hypothyroidism with cognitive slowing/etc.  Receiving home physical therapy-to be started soon.  Daughter who is a CNA will be contributing to a great deal of her care  Medications reconciled.  All scans reviewed.  Here with daughter today.  Daughter is answering all questions.  Stefany exhibits significant cognitive slowing    2. Bariatric surgery status  As result has profound vitamin deficiencies.  Has not been compliant with any supplements.  Have last seen her in person 2 years ago.  Recommendation: Will update labs.  Will replete vitamin D deficiency with ergocalciferol.  She is currently on B12 injections weekly for the next 3 weeks and then will go monthly.  - ergocalciferol (ERGOCALCIFEROL) 1,250 mcg (50,000 unit) capsule; Take 1 capsule (50,000 Units total) by mouth once a week for 12 doses.  Dispense: 12 capsule; Refill: 1    3. Vitamin deficiency  As above  - ergocalciferol (ERGOCALCIFEROL) 1,250 mcg (50,000 unit) capsule; Take 1 capsule (50,000 Units total) by mouth once a week for 12 doses.  Dispense: 12 capsule; Refill: 1  - Vitamin D, Total (25-Hydroxy)    4. Falls frequently  Secondary to profound hypothyroidism and vitamin deficiencies  No falls since discharge from hospital.  Using walker.  Has home services/home PT  - HM2(CBC w/o Differential)  - Comprehensive Metabolic Panel    5. Iron deficiency  Status post bariatric surgery.  Had received blood transfusion in the hospital.  Now on ferrous sulfate.  Eating 2 hamburgers per day  - ferrous sulfate 325 (65 FE) MG tablet; Take 1 tablet (325 mg total) by mouth 2 (two) times a day.  Dispense: 60 tablet; Refill: 6    6. Anemia, unspecified type  Above-May need GI work-up.  Notes from abdominal CT scan reviewed  - HM2(CBC w/o Differential)  -  "Comprehensive Metabolic Panel  - Vitamin B12  - Ferritin  - Iron and Transferrin Iron Binding Capacity    7. Other osteoarthritis involving multiple joints  Chronic underlying osteoarthritis-severe-multijoints replaced.  Severe spinal stenosis    8. Bruising  Noted-coags from [facility] negative    9. Hypothyroidism (acquired)  Severe  - levothyroxine (SYNTHROID, LEVOTHROID) 25 MCG tablet; Take 1 tablet (25 mcg total) by mouth daily.  - Thyroid Hughes          Georgina Cerna MD    Chief Complaint:  Chief Complaint   Patient presents with     Hospital Visit Follow Up     [facility], discharged 3/20/21, fall     Joint Swelling     bilateral       History of Present Illness:  Stefany is a 80 y.o. female who is here today accompanied by her daughter who is a certified nursing assistant for hospital discharge follow-up.  Of note, she was taken to [facility] and she sustained a fall at her home.  Apparently, after her divorce in July, she stopped taking all of her medications.  She has not been taking her thyroid medicines, or any of her vitamins.  She is status post bariatric surgery many years ago.  She became profoundly hypothyroid with cognitive changes, severe vitamin deficiency/etc.  She became weakened and had a significant fall such that she hit her head.    The images from the hospital are reviewed.  They include comprehensive images of the brain, neck, abdomen and pelvis/etc.    She was found to have profound nutritional deficiencies.  She received blood transfusion.  She is receiving weekly B12 injections but no other supplements.  She is taking 1 iron tablet per day.    Her daughter states that she is slowly improving from when she was in the hospital.  She has bruises all over her body.  She is apparently able to get up and get dressed with assistance.  She is able to get to the bathroom and care for her pet.    She is eating to [employer] hamburgers per day.  Her daughter states that \"my " "mother is sent her ways \"she will never change \".    Review of Systems:  A comprehensive review of systems was performed and was otherwise negative    PFSH:  Social History: Recently .  Has her house.  Social History     Tobacco Use   Smoking Status Never Smoker   Smokeless Tobacco Never Used       Past History:   Current Outpatient Medications   Medication Sig Dispense Refill     levothyroxine (SYNTHROID, LEVOTHROID) 25 MCG tablet Take 1 tablet (25 mcg total) by mouth daily.       ergocalciferol (ERGOCALCIFEROL) 1,250 mcg (50,000 unit) capsule Take 1 capsule (50,000 Units total) by mouth once a week for 12 doses. 12 capsule 1     ferrous sulfate 325 (65 FE) MG tablet Take 1 tablet (325 mg total) by mouth 2 (two) times a day. 60 tablet 6     Current Facility-Administered Medications   Medication Dose Route Frequency Provider Last Rate Last Admin     cyanocobalamin injection 1,000 mcg  1,000 mcg Intramuscular Q30 Days Georgina Cerna MD   1,000 mcg at 07/24/18 1313     cyanocobalamin injection 1,000 mcg  1,000 mcg Intramuscular Q30 Days Georgina Cerna MD   1,000 mcg at 08/16/19 1316     denosumab 60 mg (PROLIA 60 mg/ml)  60 mg Subcutaneous Once Georgina Cerna MD           Family History:     Physical Exam:  General Appearance:   She appears profoundly somnolent.  She is able to answer questions.  Her daughter frequently interrupts and answers.  Vitals:    03/31/21 1508   BP: 114/64   Patient Site: Left Arm   Patient Position: Sitting   Cuff Size: Adult Regular   Weight: 186 lb 6 oz (84.5 kg)   Height: 5' 7\" (1.702 m)     Wt Readings from Last 3 Encounters:   03/31/21 186 lb 6 oz (84.5 kg)   08/16/19 174 lb 7 oz (79.1 kg)   08/13/19 174 lb (78.9 kg)     Body mass index is 29.19 kg/m .    She has soft tissue edema throughout  Lungs are clear  Cardiac exam reveals a regular rate and rhythm  Abdomen nontender  Extremities reveal soft tissue swelling.  There is no tenderness to palpation of the " bones    Data Review:    Analysis and Summary of Old Records (2): Reviewed all old records    Records Requested (1):      Other History Summarized (from other people in the room) (2): Daughter contributes to the history    Radiology Tests Summarized (XRAY/CT/MRI/DXA) (1):     Labs Reviewed (1): Ordered and reviewed labs    Medicine Tests Reviewed (EKG/ECHO/COLONOSCOPY/EGD) (1):     Independent Review of EKG or X-RAY (2):

## 2021-06-16 NOTE — TELEPHONE ENCOUNTER
Telephone Encounter by Marlene Keller CMA at 10/24/2019  9:57 AM     Author: Marlene Keller CMA Service: -- Author Type: Certified Medical Assistant    Filed: 10/24/2019 12:41 PM Encounter Date: 10/23/2019 Status: Signed    : Marlene Keller CMA (Certified Medical Assistant)       Medication: Zolpidem   Last Date Filled 10/9/19   pulled: YES    Only PCP Prescribing? : YES  Taken as prescribed from physician notes? Too Early     CSA in last year: YES  Random Utox in last year: Not needed  (if any of the above answer NO - schedule with PCP)     Opioids + benzodiazepines? NO  (if the above answer YES - schedule with PCP every 6 months)     Is patient on the Executive Review Team? No      All responses suggest: Scheduling with PCP for further intervention      Return in about 2 months (around 10/16/2019).

## 2021-06-16 NOTE — TELEPHONE ENCOUNTER
Call Stefany and also her daughter who has POA that her labs are slowly improving. The B-12 is improving. However, the thyroid has not budged.  I want her to take two thyroid tabs daily  (of the 25 mcg tabs for a total of 50)     The daughter needs to get this message so that she can handle the medicines.  Follow up one month.      The hemoglobin is slowly improving with the iron.

## 2021-06-16 NOTE — TELEPHONE ENCOUNTER
Spoke to patient's daughter, Antonella and gave her the message from Dr. Cerna.  Antonella said she will be sure her Mom gets appropriate medication.     No answer when calling Stefany.  Left her message to call back to get this information from Dr. Cerna.

## 2021-06-16 NOTE — TELEPHONE ENCOUNTER
Telephone Encounter by Jeannine Worthington LPN at 11/8/2019  3:13 PM     Author: Jeannine Worthington LPN Service: -- Author Type: Licensed Nurse    Filed: 11/8/2019  3:16 PM Encounter Date: 11/8/2019 Status: Signed    : Jeannine Worthington LPN (Licensed Nurse)       Medication: Zolpidem  Last Date Filled 10/9/19   pulled: YES         Only PCP Prescribing? : NO  Taken as prescribed from physician notes? YES    CSA in last year: YES  Random Utox in last year: NO  (if any of the above answer NO - schedule with PCP)     Opioids + benzodiazepines? NO  (if the above answer YES - schedule with PCP every 6 months)     Is patient on the Executive Review Team? no      All responses suggest: Refilling prescription

## 2021-06-16 NOTE — TELEPHONE ENCOUNTER
Review of [facility] note from 3/18/21 does show pt was instructed to take levothyroxine as follows:    Start taking on: March 21, 2021  Commonly known as: SYNTHROID  Take 1 Tablet (25 mcg) by mouth before breakfast for 19 days, THEN 2 Tablets (50 mcg) before breakfast for 21 days. Take 2 hours separate from food or other medications.     Order pendesd.

## 2021-06-17 NOTE — TELEPHONE ENCOUNTER
Who is calling:  Pt's daughter   Reason for Call:  Inform provider that her mom (patient) just had a bad day yesterday and she is fine.   Date of last appointment with primary care: N/A  Okay to leave a detailed message: No

## 2021-06-17 NOTE — TELEPHONE ENCOUNTER
Schedule a time  I received the letter, but I need to work with Beulah and her together.  They should return together.That was my interpretation.    If beulah is not cooperative with meds, she may need placement, etc.

## 2021-06-17 NOTE — TELEPHONE ENCOUNTER
Telephone Encounter by Marlene Keller CMA at 11/17/2020 11:23 AM     Author: Marlene Keller CMA Service: -- Author Type: Certified Medical Assistant    Filed: 11/17/2020 11:25 AM Encounter Date: 11/16/2020 Status: Signed    : Marlene Keller CMA (Certified Medical Assistant)       Medication: Zolpidem  Last Date Filled 8/4/2020   pulled: YES    Only PCP Prescribing? : YES  Taken as prescribed from physician notes? YES    CSA in last year: NO  Random Utox in last year: not needed  (if any of the above answer NO - schedule with PCP)     Opioids + benzodiazepines? NO  (if the above answer YES - schedule with PCP every 6 months)     Is patient on the Executive Review Team? NO      All responses suggest: PCP to advise

## 2021-06-17 NOTE — TELEPHONE ENCOUNTER
Pt's daughter Antonella called wondering why Dr. Cerna has not responded to her letter she dropped off, she excpected a phone call back to discuss her mother, Daughter is upset Dr. Cerna has not called her. She needs a call from her for quidance on what to do mother refusing to take meds, does not even want to come back to see Dr. Cerna for her follow up, she insists and appreciate a telephone call back. Antonella [phone]

## 2021-06-17 NOTE — PATIENT INSTRUCTIONS - HE
Patient Instructions by Georgina Cerna MD at 8/16/2019 12:20 PM     Author: Georgina Cerna MD Service: -- Author Type: Physician    Filed: 8/16/2019  1:00 PM Encounter Date: 8/16/2019 Status: Signed    : Georgina Cerna MD (Physician)         Patient Education     Exercise for a Healthier Heart  You may wonder how you can improve the health of your heart. If youre thinking about exercise, youre on the right track. You dont need to become an athlete, but you do need a certain amount of brisk exercise to help strengthen your heart. If you have been diagnosed with a heart condition, your doctor may recommend exercise to help stabilize your condition. To help make exercise a habit, choose safe, fun activities.       Be sure to check with your health care provider before starting an exercise program.    Why exercise?  Exercising regularly offers many healthy rewards. It can help you do all of the following:    Improve your blood cholesterol levels to help prevent further heart trouble    Lower your blood pressure to help prevent a stroke or heart attack    Control diabetes, or reduce your risk of getting this disease    Improve your heart and lung function    Reach and maintain a healthy weight    Make your muscles stronger and more limber so you can stay active    Prevent falls and fractures by slowing the loss of bone mass (osteoporosis)    Manage stress better  Exercise tips  Ease into your routine. Set small goals. Then build on them.  Exercise on most days. Aim for a total of 150 or more minutes of moderate to  vigorous intensity activity each week. Consider 40 minutes, 3 to 4 times a week. For best results, activity should last for 40 minutes on average. It is OK to work up to the 40 minute period over time. Examples of moderate-intensity activity is walking one mile in 15 minutes or 30 to 45 minutes of yard work.  Step up your daily activity level. Along with your exercise program, try being  Regarding:  questioning if he can return back to work, has been in self quarantine   ----- Message from Elisa Sandoval sent at 6/3/2020  5:25 PM CDT -----  Patient Name: Oswaldo Foreman    Specialist or PCP Full Name:Vicente Menezes    Pregnant (If Yes, how long?):n/a    Symptoms: questioning if he can return back to work, has been in self quarantine     Do you or any of your household members have the following symptoms:  Fever >100.4#F or >38.0#C: No    New or worsening cough, shortness of breath, or sore throat: No    New onset of nausea, vomiting or diarrhea: No    New onset of loss of taste or smell, chills, repeated shaking with chills, muscle pain, or headache: No    Have you or a household member tested positive for COVID-19 in the last 14 days?: No    Call Back #:[phone]    Call Center Account #:[id number]    Please update the Demographics section with the patients permanent resident address          more active throughout the day. Walk instead of drive. Do more household tasks or yard work.  Choose one or more activities you enjoy. Walking is one of the easiest things you can do. You can also try swimming, riding a bike, or taking an exercise class.  Stop exercising and call your doctor if you:    Have chest pain or feel dizzy or lightheaded    Feel burning, tightness, pressure, or heaviness in your chest, neck, shoulders, back, or arms    Have unusual shortness of breath    Have increased joint or muscle pain    Have palpitations or an irregular heartbeat      4896-5529 [employer]. [address] [place] [address]. All rights reserved. This information is not intended as a substitute for professional medical care. Always follow your healthcare professional's instructions.           Advance Directive  Patients advance directive was discussed and I am comfortable with the patients wishes.  Patient Education   Personalized Prevention Plan  You are due for the preventive services outlined below.  Your care team is available to assist you in scheduling these services.  If you have already completed any of these items, please share that information with your care team to update in your medical record.  Health Maintenance   Topic Date Due   ? MEDICARE ANNUAL WELLNESS VISIT  10/07/2005   ? ZOSTER VACCINES (2 of 2) 05/29/2015   ? DXA SCAN  11/18/2016   ? INFLUENZA VACCINE RULE BASED (1) 08/01/2019   ? ADVANCE CARE PLANNING  01/23/2020   ? FALL RISK ASSESSMENT  05/22/2020   ? TD 18+ HE  05/17/2021   ? PNEUMOCOCCAL POLYSACCHARIDE VACCINE AGE 65 AND OVER  Completed   ? PNEUMOCOCCAL CONJUGATE VACCINE FOR ADULTS (PCV13 OR PREVNAR)  Completed

## 2021-06-17 NOTE — TELEPHONE ENCOUNTER
Telephone Encounter by Jeannine Worthington LPN at 7/30/2020  3:37 PM     Author: Jeannine Worthington LPN Service: -- Author Type: Licensed Nurse    Filed: 7/30/2020  3:40 PM Encounter Date: 7/29/2020 Status: Signed    : Jeannine Worthington LPN (Licensed Nurse)       Medication: Zolpidem  Last Date Filled 4/23/20   pulled: YES         Only PCP Prescribing? : YES  Taken as prescribed from physician notes? YES    CSA in last year: YES  Random Utox in last year: NO  (if any of the above answer NO - schedule with PCP)     Opioids + benzodiazepines? NO  (if the above answer YES - schedule with PCP every 6 months)     Is patient on the Executive Review Team? no      All responses suggest: Refilling prescription

## 2021-06-17 NOTE — PATIENT INSTRUCTIONS - HE
"Patient Instructions by Maria L Corey CNP at 8/13/2019  3:00 PM     Author: Maria L Corey CNP Service: -- Author Type: Nurse Practitioner    Filed: 8/13/2019  4:13 PM Encounter Date: 8/13/2019 Status: Addendum    : Maria L Corey CNP (Nurse Practitioner)    Related Notes: Original Note by Maria L Corey CNP (Nurse Practitioner) filed at 8/13/2019  4:13 PM       Patient Education     Bladder Infection, Female (Adult)    Urine is normally doesn't have any bacteria in it. But bacteria can get into the urinary tract from the skin around the rectum. Or they can travel in the blood from elsewhere in the body. Once they are in your urinary tract, they can cause infection in the urethra (urethritis), the bladder (cystitis), or the kidneys (pyelonephritis).  The most common place for an infection is in the bladder. This is called a bladder infection. This is one of the most common infections in women. Most bladder infections are easily treated. They are not serious unless the infection spreads to the kidney.  The phrases \"bladder infection,\" \"UTI,\" and \"cystitis\" are often used to describe the same thing. But they are not always the same. Cystitis is an inflammation of the bladder. The most common cause of cystitis is an infection.  Symptoms  The infection causes inflammation in the urethra and bladder. This causes many of the symptoms. The most common symptoms of a bladder infection are:    Pain or burning when urinating    Having to urinate more often than usual    Urgent need to urinate    Only a small amount of urine comes out    Blood in urine    Abdominal discomfort. This is usually in the lower abdomen above the pubic bone.    Cloudy urine    Strong- or bad-smelling urine    Unable to urinate (urinary retention)    Unable to hold urine in (urinary incontinence)    Fever    Loss of appetite    Confusion (in older adults)  Causes  Bladder infections are not contagious. You can't get one from someone " else, from a toilet seat, or from sharing a bath.  The most common cause of bladder infections is bacteria from the bowels. The bacteria get onto the skin around the opening of the urethra. From there, they can get into the urine and travel up to the bladder, causing inflammation and infection. This usually happens because of:    Wiping improperly after urinating. Always wipe from front to back.    Bowel incontinence    Pregnancy    Procedures such as having a catheter inserted    Older age    Not emptying your bladder. This can allow bacteria a chance to grow in your urine.    Dehydration    Constipation    Sex    Use of a diaphragm for birth control   Treatment  Bladder infections are diagnosed by a urine test. They are treated with antibiotics and usually clear up quickly without complications. Treatment helps prevent a more serious kidney infection.  Medicines  Medicines can help in the treatment of a bladder infection:    Take antibiotics until they are used up, even if you feel better. It is important to finish them to make sure the infection has cleared.    You can use acetaminophen or ibuprofen for pain, fever, or discomfort, unless another medicine was prescribed. If you have chronic liver or kidney disease, talk with your healthcare provider before using these medicines. Also talk with your provider if you've ever had a stomach ulcer or gastrointestinal bleeding, or are taking blood-thinner medicines.    If you are given phenazopydridine to reduce burning with urination, it will cause your urine to become a bright orange color. This can stain clothing.  Care and prevention  These self-care steps can help prevent future infections:    Drink plenty of fluids to prevent dehydration and flush out your bladder. Do this unless you must restrict fluids for other health reasons, or your doctor told you not to.    Proper cleaning after going to the bathroom is important. Wipe from front to back after using the  toilet to prevent the spread of bacteria.    Urinate more often. Don't try to hold urine in for a long time.    Wear loose-fitting clothes and cotton underwear. Avoid tight-fitting pants.    Improve your diet and prevent constipation. Eat more fresh fruit and vegetables, and fiber, and less junk and fatty foods.    Avoid sex until your symptoms are gone.    Avoid caffeine, alcohol, and spicy foods. These can irritate your bladder.    Urinate right after intercourse to flush out your bladder.    If you use birth control pills and have frequent bladder infections, discuss it with your doctor.  Follow-up care  Call your healthcare provider if all symptoms are not gone after 3 days of treatment. This is especially important if you have repeat infections.  If a culture was done, you will be told if your treatment needs to be changed. If directed, you can call to find out the results.  If X-rays were done, you will be told if the results will affect your treatment.  Call 911  Call 911 if any of the following occur:    Trouble breathing    Hard to wake up or confusion    Fainting or loss of consciousness    Rapid heart rate  When to seek medical advice  Call your healthcare provider right away if any of these occur:    Fever of 100.4 F (38.0 C) or higher, or as directed by your healthcare provider    Symptoms are not better by the third day of treatment    Back or belly (abdominal) pain that gets worse    Repeated vomiting, or unable to keep medicine down    Weakness or dizziness    Vaginal discharge    Pain, redness, or swelling in the outer vaginal area (labia)  Date Last Reviewed: 10/1/2016    8932-2094 [employer]. [address] [place] [address], [place] [address]. All rights reserved. This information is not intended as a substitute for professional medical care. Always follow your healthcare professional's instructions.         Keflex 500 mg by mouth two times a day x 10 days.   Encourage Fluids.

## 2021-06-17 NOTE — TELEPHONE ENCOUNTER
Telephone Encounter by Reyna Bustamante LPN at 3/22/2021  4:39 PM     Author: Reyna Bustamante LPN Service: -- Author Type: Licensed Nurse    Filed: 3/22/2021  4:46 PM Encounter Date: 3/22/2021 Status: Signed    : Reyna Bustamante LPN (Licensed Nurse)       No request for refills found.  Will enter a request now.    Medication: Zolpidem 10 mg, #180 tablets  Last Date Filled 12/9/20   pulled: YES                  Only PCP Prescribing? : YES  Taken as prescribed from physician notes? YES    CSA in last year: NO  Random Utox in last year: NO  Opioids + benzodiazepines? NO    Patient is scheduled to see Dr. Cerna on 3/24/21.    Is patient on the Executive Review Team? NO      All responses suggest: Refilling prescription

## 2021-06-17 NOTE — TELEPHONE ENCOUNTER
Who is calling:  Antonella Enriquez Pt. Daughter    Reason for Call:  You can disregard the letter about her Home Healthcare, she will handle all of this herself, also she does not want Home Healthcare being brought up at Pt. appts. she states this will only upset her mom.     Date of last appointment with primary care: 3/31/21    Okay to leave a detailed message: No

## 2021-06-17 NOTE — TELEPHONE ENCOUNTER
Please call her daughter Antonella and let her know that the hemoglobin is improved- up to 11.2!!!   She can simplify the iron to one a day. Also,  We are going to change the thyroid medicine back to her original baseline dose of 0.125 mcg.  (her TSH is much better!) A new prescription will be sent,  She can finish the medicine she has now at her current dose (2 of the 75mcg tabs daily)    Stefany looks very good. She is doing a great job.  Our goal is for safety.  We can assess her memory and cognition gently over time.

## 2021-06-17 NOTE — PROGRESS NOTES
[facility] Follow Up Note    Assessment/Plan:  1. Hypothyroidism (acquired)  Profound hypothyroidism from lack of compliance with medications.  Her daughter is helping manage now.  Currently, she is taking 2-75 mcg thyroid tablets daily.  Her previous baseline dose was 125.  Heart rate and vitals are stable.  We will continue the same until her labs are back.    2. Bariatric surgery status  With multiple vitamin deficiencies.  Receiving monthly IM B12/weekly vitamin D/daily iron.  They would like to reduce the iron to 2 tablets.  Will await hemogram.    3.  Altered cognition  Has short-term memory issues.  However, much more alert than last visit.  CT scan was negative for brain tumor.  Likely hypothyroidism is a significant contributing factor.  She is participating in all self-care.  Has not had any falls.  Ambulating safely with a walker.  - Thyroid Cascade    4. Anemia, unspecified type  As above  - HM2(CBC w/o Differential)      Update labs at next visit    Georgina Cerna MD    Chief Complaint:  Chief Complaint   Patient presents with     Follow-up     1 mo       History of Present Illness:  Stefany is a 80 y.o. female who is here today accompanied by her daughter Antonella for evaluation of mental status as a result of profound hypothyroidism multiple nutritional deficits that occurred from noncompliance with medications/etc.  Her daughter is now assisting with providing medications on a daily basis.  She is getting her morning meds assisted.  She is participating in self-care.  She is taking care of her dog.  She has not had any falls or safety concerns.  Overall, her family feels that she is progressing well.    Stefany answers questions for the most time appropriately.  She does repeat on occasion.  She states her bowels are working well.  Her appetite is good.  She has no pains.  She does admit that it is slow to get started with ambulation as she has multijoint DJD.    Review of Systems:  A  "comprehensive review of systems was performed and was otherwise negative    PFSH:  Social History: .  She lives in her own home.  She has a large dog who is a Doberman mix  Social History     Tobacco Use   Smoking Status Never Smoker   Smokeless Tobacco Never Used       Past History:   Past Medical History:   Diagnosis Date     DJD (degenerative joint disease)        Current Outpatient Medications   Medication Sig Dispense Refill     cyanocobalamin 1,000 mcg/mL injection Inject 1 mL (1,000 mcg total) into the shoulder, thigh, or buttocks every 7 days for 21 days, THEN 1 mL (1,000 mcg total) every 30 (thirty) days. 4 mL 3     ergocalciferol (ERGOCALCIFEROL) 1,250 mcg (50,000 unit) capsule Take 1 capsule (50,000 Units total) by mouth once a week for 12 doses. 12 capsule 1     ferrous sulfate 325 (65 FE) MG tablet Take 1 tablet (325 mg total) by mouth 2 (two) times a day. 60 tablet 6     levothyroxine (SYNTHROID, LEVOTHROID) 75 MCG tablet Take 1 tablet (75 mcg total) by mouth daily. 30 tablet 1     syringe with needle 3 mL 25 x 5/8\" Syrg Use 1 each As Directed once a week. To administer cyanocobalamin deep subcutaneous injection 4 each 3     Current Facility-Administered Medications   Medication Dose Route Frequency Provider Last Rate Last Admin     denosumab 60 mg (PROLIA 60 mg/ml)  60 mg Subcutaneous Once Georgina Cerna MD           Family History:     Physical Exam:  General Appearance:   Appears well and in no acute distress  Vitals:    05/04/21 1141   BP: 134/70   Patient Site: Left Arm   Patient Position: Sitting   Cuff Size: Adult Regular   Pulse: 62   SpO2: 98%   Weight: 178 lb 5 oz (80.9 kg)   Height: 5' 7\" (1.702 m)     Wt Readings from Last 3 Encounters:   05/04/21 178 lb 5 oz (80.9 kg)   03/31/21 186 lb 6 oz (84.5 kg)   08/16/19 174 lb 7 oz (79.1 kg)     Body mass index is 27.93 kg/m .    Lungs are clear to auscultation and percussion-cardiac exam reveals regular rate and rhythm with no murmurs " or gallops  Skin color is good.  She ambulates with a walker very steadily.  Trace lower extremity edema            Antonella contribute significantly to the history

## 2021-06-19 NOTE — PROGRESS NOTES
The following steps were completed to comply with the REMS program for Prolia:  1. Ordering provider has previously reviewed information in the Medication Guide and Patient Counseling Chart, including the serious risks of Prolia  and the symptoms of each risk and have been advised  to seek prompt medical attention if they have signs or symptoms of any of the serious risks.  2. Provided each patient a copy of the Medication Guide and Patient Brochure.  See MAR for administration details.   Indication: Prolia  (denosumab) is a prescription medicine used to treat osteoporosis in patients who:   Are at high risk for fracture, meaning patients who have had a fracture related to osteoporosis, or who have multiple risk factors for fracture; Cannot use another osteoporosis medicine or other osteoporosis medicines did not work well.   The timeline for early/late injections would be 4 weeks early and any time after the 6 month aramis. If a patient receives their injection late, then the subsequent injection would be 6 months from the date that they actually received the injection    1.  When was the last injection?  6/27/17  2.  Has insurance for this injection been verified?  Yes    Did you experience any new onset achiness or rashes that lasted for over a month with your previous Prolia injection?   No    Do you have a fever over 101?F or a new deep cough that is unusual for you today? No    Have you started any new medications in the last 6 months that you were told could affect your immune system? These may have been prescribed by oncologist, transplant, rheumatology, or dermatology.   No     In the last 6 months have you have gastric bypass or parathyroid surgery?   No    Do you plan dental work requiring drilling into the bone such as implants/extractions or oral surgery in the next 2-3 months?   Yes Okay per pcp

## 2021-06-19 NOTE — LETTER
Letter by Georgina Cerna MD at      Author: Georgina Cerna MD Service: -- Author Type: --    Filed:  Encounter Date: 5/23/2019 Status: (Other)         Stefany Subramanian  [address] [place] [address]             May 23, 2019         Dear Ms. Subramanian,    Below are the results from your recent visit:    Resulted Orders   HM2(CBC w/o Differential)   Result Value Ref Range    WBC 4.9 4.0 - 11.0 thou/uL    RBC 4.19 3.80 - 5.40 mill/uL    Hemoglobin 9.8 (L) 12.0 - 16.0 g/dL    Hematocrit 31.0 (L) 35.0 - 47.0 %    MCV 74 (L) 80 - 100 fL    MCH 23.3 (L) 27.0 - 34.0 pg    MCHC 31.6 (L) 32.0 - 36.0 g/dL    RDW 15.3 (H) 11.0 - 14.5 %    Platelets 322 140 - 440 thou/uL    MPV 7.0 7.0 - 10.0 fL   Comprehensive Metabolic Panel   Result Value Ref Range    Sodium 142 136 - 145 mmol/L    Potassium 5.1 (H) 3.5 - 5.0 mmol/L    Chloride 107 98 - 107 mmol/L    CO2 25 22 - 31 mmol/L    Anion Gap, Calculation 10 5 - 18 mmol/L    Glucose 94 70 - 125 mg/dL    BUN 13 8 - 28 mg/dL    Creatinine 0.97 0.60 - 1.10 mg/dL    GFR MDRD Af Amer >60 >60 mL/min/1.73m2    GFR MDRD Non Af Amer 56 (L) >60 mL/min/1.73m2    Bilirubin, Total 0.3 0.0 - 1.0 mg/dL    Calcium 9.9 8.5 - 10.5 mg/dL    Protein, Total 6.9 6.0 - 8.0 g/dL    Albumin 4.2 3.5 - 5.0 g/dL    Alkaline Phosphatase 132 (H) 45 - 120 U/L    AST 23 0 - 40 U/L    ALT 16 0 - 45 U/L    Narrative    Fasting Glucose reference range is 70-99 mg/dL per  American Diabetes Association (ADA) guidelines.   Thyroid Cascade   Result Value Ref Range    TSH 30.75 (H) 0.30 - 5.00 uIU/mL   Vitamin D, Total (25-Hydroxy)   Result Value Ref Range    Vitamin D, Total (25-Hydroxy) 13.2 (L) 30.0 - 80.0 ng/mL    Narrative    Deficiency <10.0 ng/mL  Insufficiency 10.0-29.9 ng/mL  Sufficiency 30.0-80.0 ng/mL  Toxicity (possible) >100.0 ng/mL   Vitamin B12   Result Value Ref Range    Vitamin B-12 153 (L) 213 - 816 pg/mL   Ferritin   Result Value Ref Range    Ferritin 6 (L) 10 - 130 ng/mL   T4,  Free   Result Value Ref Range    Free T4 0.7 0.7 - 1.8 ng/dL       Stefany, you are clearly not taking any of your vitamins or thyroid medication.  You are deficient in vitamin D, iron and B12.  Additionally, you have not been taking her thyroid medication.    Please resume your ordered supplements.  You should be taking an iron tablet with food twice daily.  Additionally, you should be taking sublingual (under the tongue B12) daily.  You should be taking 5000 international units of vitamin D3 every single day.    You should see me in a couple of months to recheck all of these indices.    Please call with questions or contact us using [employer].    Sincerely,        Electronically signed by Georgina Cerna MD

## 2021-06-19 NOTE — PROGRESS NOTES
[facility] Follow Up Note    Assessment/Plan:  1. Osteoporosis-classified as severe due to the presence of numerous thoracic vertebral compression fractures  Of note, she is due for Prolia injections.  She does report that she will be seeing her dentist but denies any up and coming dental surgeries.  Will update vitamin D level.  Encourage balance and weightbearing exercise which has been difficult due to her multi-joint DJD  - Vitamin D, Total (25-Hydroxy)  - denosumab 60 mg (PROLIA 60 mg/ml); Inject 1 mL (60 mg total) under the skin every 6 (six) months.  Of note, she is due for a bone density.    2. Vitamin deficiency syndrome  We will update labs  - ergocalciferol (VITAMIN D2) 50,000 unit capsule; TAKE ONE CAPSULE BY MOUTH ONCE EVERY WEEK  Dispense: 13 capsule; Refill: 0  - Vitamin D, Total (25-Hydroxy)    3. Insomnia  Persistent insomnia despite Ambien.  Of note, she had a thorough sleep assessment.  Ambien at 20 mg was recommended several years ago.  She has remained on this since  - zolpidem (AMBIEN) 10 mg tablet; Take 2 tablets (20 mg total) by mouth at bedtime.  Dispense: 180 tablet; Refill: 2    4. DJD (degenerative joint disease)  She has multi-joint DJD.  She has shoulder pain, hip pain, knee pain, etc.  She has been followed by orthopedic surgery at the [facility].  She is wondering if there is a little something that she can have for pain.  Of note, she has untoward reactions to narcotic medications and we would not prescribe those.  Recommendation: We will proceed with a short course of prednisone along with gabapentin.  The gabapentin will be taken at bedtime.  No narcotics will be prescribed  - Erythrocyte Sedimentation Rate  - HM2(CBC w/o Differential)  - Comprehensive Metabolic Panel  - C-Reactive Protein(CRP)    5. Acquired hypothyroidism  We will update labs      Follow-up for comprehensive physical in the next couple of months    Georgina Cerna MD    Chief Complaint:  Chief  "Complaint   Patient presents with     Neck Pain     constant neck and shoulder pain     Hip Pain     constant left hip pain     Acne     face       History of Present Illness:  Stefany is a 77 y.o. female who has a past medical history of chronic and severe DJD of multiple joints.  She has been seen by spine surgery at the [facility].  She has had multiple joint replacements.  She states that she \"aches all over\".  She is wondering if she can have something for pain.  Additionally, she states that she continues to have difficulty with sleep.  She is on Ambien 20 mg after a sleep assessment many years ago.  She is requesting additional medications.    She is status post gastric bypass surgery many years ago.  She continues to be very physically active in her yard.  She has adopted a puppy which keeps her moving outside.  She has known osteoporosis.  She is on ergocalciferol and her labs will be updated today.  She is due for Prolia injection.  Of note, the Prolia protocol was reviewed with her.  She does report to me that although she has dental issues, she is not scheduled for any extractions or surgeries.  She sees her dentist every 2 months.    Review of Systems:  A comprehensive review of systems was performed and was otherwise negative    PFSH:  Social History: She is .  She has a new puppy.  She adopted her grandson who is now in his 20s and lives outside of the house.  History   Smoking Status     Never Smoker   Smokeless Tobacco     Never Used       Past History:   Past Medical History:   Diagnosis Date     DJD (degenerative joint disease)        Current Outpatient Prescriptions   Medication Sig Dispense Refill     acetaminophen (TYLENOL) 500 MG tablet Take 500-1,000 mg by mouth every 6 (six) hours as needed for pain. Do not exceed 4000 mg in 24 hours       cyclobenzaprine (FLEXERIL) 10 MG tablet Take 1 tablet (10 mg total) by mouth bedtime as needed for muscle spasms. 30 tablet 0     ergocalciferol " (ERGOCALCIFEROL) 50,000 unit capsule TAKE 1 CAPSULE BY MOUTH 1 TIME EVERY WEEK 13 capsule 0     ergocalciferol (VITAMIN D2) 50,000 unit capsule TAKE ONE CAPSULE BY MOUTH ONCE EVERY WEEK 13 capsule 0     fluticasone (FLONASE) 50 mcg/actuation nasal spray 2 sprays into each nostril bedtime.       levothyroxine (SYNTHROID, LEVOTHROID) 125 MCG tablet TAKE 1 TABLET BY MOUTH EVERY DAY 90 tablet 2     senna-docusate (SENNOSIDES-DOCUSATE SODIUM) 8.6-50 mg tablet Take 1-2 tablets by mouth bedtime as needed for constipation.       zolpidem (AMBIEN) 10 mg tablet Take 2 tablets (20 mg total) by mouth at bedtime. 180 tablet 2     gabapentin (NEURONTIN) 100 MG capsule 1-2 tabs one hour before bedtime 60 capsule 3     methylPREDNISolone (MEDROL DOSEPACK) 4 mg tablet follow package directions 21 tablet 0     metroNIDAZOLE (METROGEL) 1 % gel Thin layer hs. 45 g 0     Current Facility-Administered Medications   Medication Dose Route Frequency Provider Last Rate Last Dose     cyanocobalamin injection 1,000 mcg  1,000 mcg Intramuscular Q30 Days Georgina Cerna MD   1,000 mcg at 07/24/18 1313     denosumab 60 mg (PROLIA 60 mg/ml)  60 mg Subcutaneous Q6 Months Georgina Cerna MD         denosumab 60 mg (PROLIA 60 mg/ml)  60 mg Subcutaneous Q6 Months Georgina Cerna MD   60 mg at 07/24/18 1313       Family History: Noncontributory    Physical Exam:  General Appearance:   She is very pleasant.  Her affect is bright.  She does not appear acutely ill  Vitals:    07/24/18 1246   BP: 128/78   Patient Site: Left Arm   Patient Position: Sitting   Cuff Size: Adult Regular   Pulse: 68   SpO2: 99%   Weight: 167 lb 8 oz (76 kg)     Wt Readings from Last 3 Encounters:   07/24/18 167 lb 8 oz (76 kg)   01/31/17 164 lb 14.4 oz (74.8 kg)   10/07/16 165 lb (74.8 kg)     Body mass index is 26.23 kg/(m^2).

## 2021-06-19 NOTE — LETTER
Letter by Georgina Cerna MD at      Author: Georgina Cerna MD Service: -- Author Type: --    Filed:  Encounter Date: 5/23/2019 Status: (Other)

## 2021-06-19 NOTE — LETTER
Letter by Georgina Cerna MD at      Author: Georgina Cerna MD Service: -- Author Type: --    Filed:  Encounter Date: 8/20/2019 Status: (Other)         Stefany Subramanian  [address] [place] [address]             August 20, 2019         Dear Ms. Subramanian,    Below are the results from your recent visit:    Resulted Orders   HM2(CBC w/o Differential)   Result Value Ref Range    WBC 6.1 4.0 - 11.0 thou/uL    RBC 3.33 (L) 3.80 - 5.40 mill/uL    Hemoglobin 8.2 (L) 12.0 - 16.0 g/dL    Hematocrit 25.7 (L) 35.0 - 47.0 %    MCV 77 (L) 80 - 100 fL    MCH 24.5 (L) 27.0 - 34.0 pg    MCHC 31.8 (L) 32.0 - 36.0 g/dL    RDW 17.2 (H) 11.0 - 14.5 %    Platelets 418 140 - 440 thou/uL    MPV 6.9 (L) 7.0 - 10.0 fL   Comprehensive Metabolic Panel   Result Value Ref Range    Sodium 140 136 - 145 mmol/L    Potassium 3.7 3.5 - 5.0 mmol/L    Chloride 106 98 - 107 mmol/L    CO2 25 22 - 31 mmol/L    Anion Gap, Calculation 9 5 - 18 mmol/L    Glucose 92 70 - 125 mg/dL    BUN 11 8 - 28 mg/dL    Creatinine 0.86 0.60 - 1.10 mg/dL    GFR MDRD Af Amer >60 >60 mL/min/1.73m2    GFR MDRD Non Af Amer >60 >60 mL/min/1.73m2    Bilirubin, Total 0.4 0.0 - 1.0 mg/dL    Calcium 8.9 8.5 - 10.5 mg/dL    Protein, Total 6.5 6.0 - 8.0 g/dL    Albumin 4.0 3.5 - 5.0 g/dL    Alkaline Phosphatase 59 45 - 120 U/L    AST 25 0 - 40 U/L    ALT 17 0 - 45 U/L    Narrative    Fasting Glucose reference range is 70-99 mg/dL per  American Diabetes Association (ADA) guidelines.   Thyroid Cascade   Result Value Ref Range    TSH 63.83 (H) 0.30 - 5.00 uIU/mL   Vitamin D, Total (25-Hydroxy)   Result Value Ref Range    Vitamin D, Total (25-Hydroxy) 11.7 (L) 30.0 - 80.0 ng/mL    Narrative    Deficiency <10.0 ng/mL  Insufficiency 10.0-29.9 ng/mL  Sufficiency 30.0-80.0 ng/mL  Toxicity (possible) >100.0 ng/mL   Vitamin B12   Result Value Ref Range    Vitamin B-12 179 (L) 213 - 816 pg/mL   Ferritin   Result Value Ref Range    Ferritin 5 (L) 10 - 130 ng/mL   Lipid  [place] FASTING   Result Value Ref Range    Cholesterol 209 (H) <=199 mg/dL    Triglycerides 100 <=149 mg/dL    HDL Cholesterol 59 >=50 mg/dL    LDL Calculated 130 (H) <=129 mg/dL    Patient Fasting > 8hrs? Yes    Urinalysis-UC if Indicated   Result Value Ref Range    Color, UA Yellow Colorless, Yellow, Straw, Light Yellow    Clarity, UA Clear Clear    Glucose, UA Negative Negative    Bilirubin, UA Negative Negative    Ketones, UA Negative Negative    Specific Gravity, UA 1.015 1.005 - 1.030    Blood, UA Negative Negative    pH, UA 6.5 5.0 - 8.0    Protein, UA Negative Negative mg/dL    Urobilinogen, UA 0.2 E.U./dL 0.2 E.U./dL, 1.0 E.U./dL    Nitrite, UA Negative Negative    Leukocytes, UA Negative Negative    Narrative    Microscopic not indicated  UC not indicated   T4, Free   Result Value Ref Range    Free T4 <0.4 (L) 0.7 - 1.8 ng/dL     Stefany, your labs are profoundly abnormal.  Your thyroid function is exceedingly low.  I believe this is because you are not taking your medications on an empty stomach on a regular basis.  You must take your medications daily.  Additionally, your vitamin D is virtually nonexistent.  Your iron levels, B12, etc. are low.  I need to see you back in approximately 2 months to recheck all labs.  I do think getting back on the bandwagon with supplements and medications will make you feel stronger and overall improved.    Please let me know if you have any questions.    Please call with questions or contact us using [employer].    Sincerely,        Electronically signed by Georgina Cerna MD

## 2021-06-19 NOTE — LETTER
Letter by Georgina Cerna MD at      Author: Georgina Cerna MD Service: -- Author Type: --    Filed:  Encounter Date: 5/22/2019 Status: (Other)         [facility] INTERNAL MEDICINE  05/22/19    Patient: Stefany Subramanian  YOB: 1940  Medical Record Number: [id number]  CSN: [id number]                                                                              Non-opioid Controlled Substance Agreement    I understand that my care provider has prescribed a controlled substance to help manage my condition(s). I am taking this medicine to help me function or work. I know this is strong medicine, and that it can cause serious side effects. Controlled substances can be sedating, addicting and may cause a dependency on the drug. They can affect my ability to drive or think, and cause depression. They need to be taken exactly as prescribed. Combining controlled substances with certain medicines or chemicals (such as cocaine, sedatives and tranquilizers, sleeping pills, meth) can be dangerous or even fatal. Also, if I stop controlled substances suddenly, I may have severe withdrawal symptoms.  If not helpful, I may be asked to stop them.    The risks, benefits, and side effects of these medicine(s) were explained to me. I agree that:    1. I will take part in other treatments as advised by my care team. This may be psychiatry or counseling, physical therapy, behavioral therapy, group treatment or a referral to a pain clinic. I will reduce or stop my medicine when my care team tells me to do so.  2. I will take my medicines as prescribed. I will not change the dose or schedule unless my care team tells me to. There will be no refills if I run out early.  I may be contactedwithout warning and asked to complete a urine drug test or pill count at any time.   3. I will keep all my appointments, and understand this is part of the monitoring of controlled substances. My care team may require an office visit for EVERY  controlled substance refill. If I miss appointments or dont follow instructions, my care team may stop my medicine.  4. I will not ask other providers to prescribe controlled substances, and I will not accept controlled substances from other people. If I need another prescribed controlled substance for a new reason, I will tell my care team within 1 business day.  5. I will use one pharmacy to fill all of my controlled substance prescriptions, and it is up to me to make sure that I do not run out of my medicines on weekends or holidays. If my care team is willing to refill my controlled substance prescription without a visit, I must request refills only during office hours, refills may take up to 3 days to process, and it may take up to 5 to 7 days for my medicine to be mailed and ready at my pharmacy. Prescriptions will not be mailed anywhere except my pharmacy.    6. I am responsible for my prescriptions. If the medicine/prescription is lost or stolen, it will not be replaced. I also agree not to share controlled substance medicines with anyone.          [facility] INTERNAL MEDICINE  05/22/19  Patient:  Stefany Lovingbury  YOB: 1940  Medical Record Number: [id number]  CSN: [id number]    7. I agree to not use ANY illegal or recreational drugs. This includes marijuana, cocaine, bath salts or other drugs. I agree not to use alcohol unless my care team says I may. I agree to give urine samples whenever asked. If I dont give a urine sample, the care team may stop my medicine.    8. If I enroll in the [place] Medical Marijuana program, I will tell my care team. I will also sign an agreement to share my medical records with my care team.    9. I will bring in my list of medicines (or my medicine bottles) each time I come to the clinic.   10. I will tell my care team right away if I become pregnant or have a new medical problem treated outside of my regular clinic.  11. I understand that this medicine can affect my  thinking and judgment. It may be unsafe for me to drive, use machinery and do dangerous tasks. I will not do any of these things until I know how the medicine affects me. If my dose changes, I will wait to see how it affects me. I will contact my care team if I have concerns about medicine side effects.    I understand that if I do not follow any of the conditions above, my prescriptions or treatment may be stopped.      I agree that my provider, clinic care team, and pharmacy may work with any city, state or federal law enforcement agency that investigates the misuse, sale, or other diversion of my controlled medicine. I will allow my provider to discuss my care with or share a copy of this agreement with any other treating provider, pharmacy or emergency room where I receive care. I agree to give up (waive) any right of privacy or confidentiality with respect to these consents.   I have read this agreement and have asked questions about anything I did not understand.    ___________________________________________________________________________  Patient signature - Date/Time  -Stefany Subramanian                                      ___________________________________________________________________________  Witness signature                                                                    ___________________________________________________________________________  Provider signature- Georgina Cerna MD

## 2021-06-20 NOTE — PROGRESS NOTES
[facility] Follow Up Note    Assessment/Plan:  1. Ankle fracture  Nondisplaced ankle fracture after tripping over a large dog.  Definitely a traumatic fracture as described.  Recommendation: Continue with compression wrap.  Avoid flip-flops as she is wearing today.  We are boot as provided by the urgent care clinic.  Recommend: Referral to orthopedic surgery  - Ambulatory referral to Orthopedic Surgery    2. Rib fractures  No pain.  Continue to monitor    3. Shoulder pain, bilateral  Severe DJD of the shoulders bilaterally.  Recommendation: We will have her follow-up with orthopedic surgery for consideration of injections.  - Ambulatory referral to Orthopedic Surgery      Of note, FMLA paperwork was filled out for her  who is driving her to her appointments now that she has a left ankle fracture    Georgina Cerna MD    Chief Complaint:  Chief Complaint   Patient presents with     Follow-up       History of Present Illness:  Stefany is a 77 y.o. female who is here today for follow-up after being seen in the emergency room for evaluation of a fall.  Apparently, she was walking her 100 pound dog.  She got tangled in the leash and fell.  She sustained a right ankle fracture along with 2 fractured ribs on the right.  She is here today for follow-up.  Of note, she was not given any instructions for follow-up with orthopedic surgery.  She states she was not given pain medications.    As time has passed, she is doing better with the pain.  She is using Tylenol.  She is continuing to proceed with normal activities of daily living.  The foot does swell towards the end of the day but this is improved with the compression garment that she is wearing.  She was also given a boot for support.    She presents today with FMLA forms from her .  She is unable to drive as this was her right foot    Additionally, labs from last visit were reviewed.  She is very low in iron and has begun taking iron  supplementation.    Review of Systems:  A comprehensive review of systems was performed and was otherwise negative    PFSH:  Social History: She is .  She lives with her .  History   Smoking Status     Never Smoker   Smokeless Tobacco     Never Used       Past History: Significant for osteoporosis on Prolia  Current Outpatient Prescriptions   Medication Sig Dispense Refill     acetaminophen (TYLENOL) 500 MG tablet Take 500-1,000 mg by mouth every 6 (six) hours as needed for pain. Do not exceed 4000 mg in 24 hours       cyclobenzaprine (FLEXERIL) 10 MG tablet Take 1 tablet (10 mg total) by mouth bedtime as needed for muscle spasms. 30 tablet 0     ergocalciferol (VITAMIN D2) 50,000 unit capsule TAKE ONE CAPSULE BY MOUTH ONCE EVERY WEEK 13 capsule 0     fluticasone (FLONASE) 50 mcg/actuation nasal spray 2 sprays into each nostril bedtime.       gabapentin (NEURONTIN) 100 MG capsule One to two tabs tid 120 capsule 3     levothyroxine (SYNTHROID, LEVOTHROID) 125 MCG tablet TAKE 1 TABLET BY MOUTH EVERY DAY 90 tablet 2     metroNIDAZOLE (METROGEL) 1 % gel Thin layer hs. 45 g 0     senna-docusate (SENNOSIDES-DOCUSATE SODIUM) 8.6-50 mg tablet Take 1-2 tablets by mouth bedtime as needed for constipation.       zolpidem (AMBIEN) 10 mg tablet Take 2 tablets (20 mg total) by mouth at bedtime. 180 tablet 2     Current Facility-Administered Medications   Medication Dose Route Frequency Provider Last Rate Last Dose     cyanocobalamin injection 1,000 mcg  1,000 mcg Intramuscular Q30 Days Georgina Cerna MD   1,000 mcg at 07/24/18 1313     denosumab 60 mg (PROLIA 60 mg/ml)  60 mg Subcutaneous Q6 Months Georgina Cerna MD   60 mg at 07/24/18 1313       Family History: Noncontributory    Physical Exam:  General Appearance:   She is in very good spirits.  She is pleasant  Vitals:    08/24/18 1254   BP: 122/64   Patient Site: Left Arm   Patient Position: Sitting   Cuff Size: Adult Regular   Pulse: 72   Weight: 161 lb  (73 kg)     Wt Readings from Last 3 Encounters:   08/24/18 161 lb (73 kg)   07/24/18 167 lb 8 oz (76 kg)   01/31/17 164 lb 14.4 oz (74.8 kg)     Body mass index is 25.22 kg/(m^2).    Right foot is with minimal swelling and a compression garment.  She is wearing flip-flops.    Data Review:    Analysis and Summary of Old Records (2): Reviewed urgent care records    Records Requested (1): 0      Other History Summarized (from other people in the room) (2): 0    Radiology Tests Summarized (XRAY/CT/MRI/DXA) (1): Viewed x-rays    Labs Reviewed (1): Discuss labs from last visit    Medicine Tests Reviewed (EKG/ECHO/COLONOSCOPY/EGD) (1): 0    Independent Review of EKG or X-RAY (2): 0

## 2021-06-21 NOTE — LETTER
Letter by Georgina Cerna MD at      Author: Georgina Cerna MD Service: -- Author Type: --    Filed:  Encounter Date: 4/1/2021 Status: (Other)         Stefany Subramanian  [address] [place] [address]             April 1, 2021         Dear Ms. Subramanian,    Below are the results from your recent visit:    Resulted Orders   HM2(CBC w/o Differential)   Result Value Ref Range    WBC 6.8 4.0 - 11.0 thou/uL    RBC 3.85 3.80 - 5.40 mill/uL    Hemoglobin 9.1 (L) 12.0 - 16.0 g/dL    Hematocrit 31.2 (L) 35.0 - 47.0 %    MCV 81 80 - 100 fL    MCH 23.6 (L) 27.0 - 34.0 pg    MCHC 29.2 (L) 32.0 - 36.0 g/dL    RDW 21.9 (H) 11.0 - 14.5 %    Platelets 344 140 - 440 thou/uL    MPV 8.8 7.0 - 10.0 fL   Comprehensive Metabolic Panel   Result Value Ref Range    Sodium 136 136 - 145 mmol/L    Potassium 4.2 3.5 - 5.0 mmol/L    Chloride 101 98 - 107 mmol/L    CO2 23 22 - 31 mmol/L    Anion Gap, Calculation 12 5 - 18 mmol/L    Glucose 85 70 - 125 mg/dL    BUN 13 8 - 28 mg/dL    Creatinine 1.04 0.60 - 1.10 mg/dL    GFR MDRD Af Amer >60 >60 mL/min/1.73m2    GFR MDRD Non Af Amer 51 (L) >60 mL/min/1.73m2    Bilirubin, Total 0.5 0.0 - 1.0 mg/dL    Calcium 9.1 8.5 - 10.5 mg/dL    Protein, Total 6.7 6.0 - 8.0 g/dL    Albumin 4.2 3.5 - 5.0 g/dL    Alkaline Phosphatase 100 45 - 120 U/L    AST 28 0 - 40 U/L    ALT 16 0 - 45 U/L    Narrative    Fasting Glucose reference range is 70-99 mg/dL per  American Diabetes Association (ADA) guidelines.   Thyroid Cascade   Result Value Ref Range    TSH 62.83 (H) 0.30 - 5.00 uIU/mL   Vitamin D, Total (25-Hydroxy)   Result Value Ref Range    Vitamin D, Total (25-Hydroxy) 11.9 (L) 30.0 - 80.0 ng/mL    Narrative    Deficiency <10.0 ng/mL  Insufficiency 10.0-29.9 ng/mL  Sufficiency 30.0-80.0 ng/mL  Toxicity (possible) >100.0 ng/mL   Vitamin B12   Result Value Ref Range    Vitamin B-12 469 213 - 816 pg/mL   Ferritin   Result Value Ref Range    Ferritin 20 10 - 130 ng/mL   Iron and Transferrin Iron  Binding Capacity   Result Value Ref Range    Iron 72 42 - 175 ug/dL    Transferrin 383 (H) 212 - 360 mg/dL    Transferrin Saturation, Calculated 15 (L) 20 - 50 %    Transferrin IBC, Calculated 479 313 - 563 ug/dL   T4, Free   Result Value Ref Range    Free T4 0.5 (L) 0.7 - 1.8 ng/dL       Stefany, there are still many deficiencies here.  Hopefully, you have increased the thyroid dose to two tabs as was recommended. We will meet soon to recheck!    Please call with questions or contact us using [employer].    Sincerely,        Electronically signed by Georgina Cerna MD

## 2021-06-24 NOTE — TELEPHONE ENCOUNTER
"Prolia Injection Phone Screen      Screening questions have been asked 2-3 days prior to administration visit for Prolia. If any questions are answered with \"Yes,\" this phone encounter were will routed to ordering provider for further evaluation.     1.  When was the last injection?  7/24/18    2.  Has insurance for this injection been verified?  Yes    3.  Did you experience any new onset achiness or rashes that lasted for over a month with your previous Prolia injection?   No    4.  Do you have a fever over 101?F or a new deep cough that is unusual for you today? No    5.  Have you started any new medications in the last 6 months that you were told could affect your immune system? These may have been prescribed by oncologist, transplant, rheumatology, or dermatology.   No    6.  In the last 6 months have you have gastric bypass or parathyroid surgery?   No    7.  Do you plan dental work requiring drilling into the bone such as implants/extractions or oral surgery in the next 2-3 months?   planning 3 extractions soon.    8. Do you have new insurance since the last injection? No    Reviewed with Dr Doty, due to planned dental extractions pt to wait for next Prolia injection until extraction sites healed. Pt informed and will call back to reschedule injection      Marlene Aguirre              "

## 2021-06-25 NOTE — TELEPHONE ENCOUNTER
Reason for call:  Patient reporting a symptom  Thinks she has a UTI    Symptom or request: very uncomfortable urination    Duration (how long have symptoms been present): since last night 6/13     Cynthia ludin Shah if script can be send in.  She does have to catch a bus and was worried about heat. She is willing to come in if needed.    Please call pt and let her know    Have you been treated for this before? Yes    Additional comments: would like meds or come in for lab test.    Phone Number patient can be reached at:    Cell number on file:    Telephone Information:   Mobile [phone]       Best Time:  any    Can we leave a detailed message on this number: Yes    Call taken on 6/14/2021 at 3:53 PM by Pamela Behr

## 2021-06-25 NOTE — TELEPHONE ENCOUNTER
Daughter called back and they are going to have Mom drink more water and see how she feels tomorrow.  If still not good then they will call to schedule lab only appt.    She said Mom only drink Diet Coke

## 2021-07-03 NOTE — ADDENDUM NOTE
Addendum Note by Rayne Lockwood MLT at 4/29/2020  1:00 PM     Author: Rayne Lockwood MLT Service: -- Author Type:     Filed: 4/29/2020  4:09 PM Encounter Date: 4/29/2020 Status: Signed    : Rayne Lockwood MLT ()    Addended by: RAYNE LOCKWOOD on: 4/29/2020 04:09 PM        Modules accepted: Orders

## 2021-07-20 ENCOUNTER — TELEPHONE (OUTPATIENT)
Dept: INTERNAL MEDICINE | Facility: CLINIC | Age: 81
End: 2021-07-20

## 2021-07-25 DIAGNOSIS — E55.9 VITAMIN D DEFICIENCY: Primary | ICD-10-CM

## 2021-07-25 RX ORDER — ERGOCALCIFEROL 1.25 MG/1
CAPSULE, LIQUID FILLED ORAL
Qty: 12 CAPSULE | Refills: 3 | Status: SHIPPED | OUTPATIENT
Start: 2021-07-25 | End: 2022-04-12

## 2021-07-26 NOTE — TELEPHONE ENCOUNTER
Routing refill request to provider for review/approval because:  Drug not on the [facility] refill protocol.  Drug  off of med list.  ___________________________________________________________    Copy of Last Rx:          Last office visit with provider: 21 ___________________________________________________________    Requested Prescriptions   Pending Prescriptions Disp Refills     vitamin D2 (ERGOCALCIFEROL) 11815 units (1250 mcg) capsule [Pharmacy Med Name: VITAMIN D2 50,000IU (ERGO) CAP RX] 12 capsule      Sig: TAKE 1 CAPSULE BY MOUTH 1 TIME A WEEK FOR 12 DOSES       There is no refill protocol information for this order          Ayanna Ybarra RN 21 7:49 PM

## 2021-12-19 RX ORDER — CYANOCOBALAMIN 1000 UG/ML
INJECTION, SOLUTION INTRAMUSCULAR; SUBCUTANEOUS
Qty: 4 ML | OUTPATIENT
Start: 2021-12-19

## 2021-12-19 NOTE — TELEPHONE ENCOUNTER
Refilled 4/16/21 with 3 refills.    Outpatient Medication Detail     Disp Refills Start End NILDA   cyanocobalamin 1,000 mcg/mL injection 4 mL 3 4/16/2021 5/7/2022 --   Sig - Route: Inject 1 mL (1,000 mcg total) into the shoulder, thigh, or buttocks every 7 days for 21 days, THEN 1 mL (1,000 mcg total) every 30 (thirty) days. - Intramuscular   Sent to pharmacy as: cyanocobalamin (vit B-12) 1,000 mcg/mL injection solution   E-Prescribing Status: Receipt confirmed by pharmacy (4/16/2021 12:42 PM CDT)

## 2022-03-13 DIAGNOSIS — E53.8 VITAMIN B12 DEFICIENCY (NON ANEMIC): Primary | ICD-10-CM

## 2022-03-13 NOTE — TELEPHONE ENCOUNTER
"Routing refill request to provider for review/approval because:  PCP to review SIG    Last Written Prescription Date:  4/16/2021  Last Fill Quantity: 4 mL,  # refills: 3   Last office visit provider:  5/4/2021 Dr. Cerna     cyanocobalamin 1,000 mcg/mL injection 4 mL 3 4/16/2021 5/7/2022 --   Sig - Route: Inject 1 mL (1,000 mcg total) into the shoulder, thigh, or buttocks every 7 days for 21 days, THEN 1 mL (1,000 mcg total) every 30 (thirty) days. - Intramuscular   Sent to pharmacy as: cyanocobalamin (vit B-12) 1,000 mcg/mL injection solution   E-Prescribing Status: Receipt confirmed by pharmacy (4/16/2021 12:42 PM CDT         Requested Prescriptions   Pending Prescriptions Disp Refills     cyanocobalamin (CYANOCOBALAMIN) 1000 MCG/ML injection [Pharmacy Med Name: CYANOCOBALAMIN 1000MCG/ML INJ, 1ML] 4 mL      Sig: INJECT 1ML IN THE SHOULDER, THIGH, OR BUTTOCKS EVERY 7 DAYS FOR 21 DAYS; THEN 1ML EVERY 30 DAYS       Vitamin Supplements (Adult) Protocol Passed - 3/13/2022 10:29 AM        Passed - High dose Vitamin D not ordered        Passed - Recent (12 mo) or future (30 days) visit within the authorizing provider's specialty     Patient has had an office visit with the authorizing provider or a provider within the authorizing providers department within the previous 12 mos or has a future within next 30 days. See \"Patient Info\" tab in inbasket, or \"Choose Columns\" in Meds & Orders section of the refill encounter.              Passed - Medication is active on med list             Amira Johnson RN 03/13/22 3:51 PM  "

## 2022-03-14 DIAGNOSIS — E03.9 ACQUIRED HYPOTHYROIDISM: ICD-10-CM

## 2022-03-14 RX ORDER — LEVOTHYROXINE SODIUM 125 UG/1
TABLET ORAL
Qty: 90 TABLET | Refills: 0 | Status: SHIPPED | OUTPATIENT
Start: 2022-03-14

## 2022-03-14 RX ORDER — CYANOCOBALAMIN 1000 UG/ML
INJECTION, SOLUTION INTRAMUSCULAR; SUBCUTANEOUS
Qty: 4 ML | Refills: 3 | Status: SHIPPED | OUTPATIENT
Start: 2022-03-14

## 2022-03-14 NOTE — TELEPHONE ENCOUNTER
"Routing refill request to provider for review/approval because:  Possible duplicate    Last Written Prescription Date:  3/11/22  Last Fill Quantity: 90,  # refills: 0   Last office visit provider:  5/4/21     Requested Prescriptions   Pending Prescriptions Disp Refills     levothyroxine (SYNTHROID/LEVOTHROID) 125 MCG tablet [Pharmacy Med Name: LEVOTHYROXINE 0.125MG (125MCG) TAB] 90 tablet 0     Sig: TAKE 1 TABLET BY MOUTH EVERY DAY       Thyroid Protocol Passed - 3/14/2022  8:00 AM        Passed - Patient is 12 years or older        Passed - Recent (12 mo) or future (30 days) visit within the authorizing provider's specialty     Patient has had an office visit with the authorizing provider or a provider within the authorizing providers department within the previous 12 mos or has a future within next 30 days. See \"Patient Info\" tab in inbasket, or \"Choose Columns\" in Meds & Orders section of the refill encounter.              Passed - Medication is active on med list        Passed - Normal TSH on file in past 12 months     Recent Labs   Lab Test 05/04/21  1227   TSH 4.33              Passed - No active pregnancy on record     If patient is pregnant or has had a positive pregnancy test, please check TSH.          Passed - No positive pregnancy test in past 12 months     If patient is pregnant or has had a positive pregnancy test, please check TSH.               Goldie Mendez RN 03/14/22 12:35 PM  "

## 2022-04-09 DIAGNOSIS — E55.9 VITAMIN D DEFICIENCY: ICD-10-CM

## 2022-04-11 NOTE — TELEPHONE ENCOUNTER
Routing refill request to provider for review/approval because:  Drug not on the [facility] refill protocol   Early refill request    Last Written Prescription Date:  7/25/2021  Last Fill Quantity: 12,  # refills: 3   Last office visit provider:  5/4/2021     Requested Prescriptions   Pending Prescriptions Disp Refills     vitamin D2 (ERGOCALCIFEROL) 38625 units (1250 mcg) capsule [Pharmacy Med Name: VITAMIN D2 50,000IU (ERGO) CAP RX] 12 capsule 3     Sig: TAKE 1 CAPSULE BY MOUTH 1 TIME A WEEK FOR 12 DOSES       There is no refill protocol information for this order          Lina Hutton, ELZBIETA 04/11/22 3:20 PM

## 2022-04-12 RX ORDER — ERGOCALCIFEROL 1.25 MG/1
CAPSULE, LIQUID FILLED ORAL
Qty: 12 CAPSULE | Refills: 3 | Status: SHIPPED | OUTPATIENT
Start: 2022-04-12 | End: 2023-01-01

## 2022-05-11 DIAGNOSIS — E61.1 IRON DEFICIENCY: ICD-10-CM

## 2022-05-13 RX ORDER — PNV NO.95/FERROUS FUM/FOLIC AC 28MG-0.8MG
TABLET ORAL
Qty: 60 TABLET | Refills: 0 | Status: SHIPPED | OUTPATIENT
Start: 2022-05-13

## 2022-05-13 NOTE — TELEPHONE ENCOUNTER
"Routing refill request to provider for review/approval because:  Labs not current:      Last Written Prescription Date:  3/11/22  Last Fill Quantity: 60,  # refills: 0   Last office visit provider:  5/4/21     Requested Prescriptions   Pending Prescriptions Disp Refills     Ferrous Sulfate (IRON) 325 (65 Fe) MG tablet [Pharmacy Med Name: IRON 325MG HIGH POTENCY TABLETS] 60 tablet 0     Sig: TAKE 1 TABLET(325 MG) BY MOUTH TWICE DAILY       Iron Supplements Failed - 5/11/2022  7:26 PM        Failed - Recent (12 mo) or future (30 days) visit within the authorizing provider's specialty     Patient has had an office visit with the authorizing provider or a provider within the authorizing providers department within the previous 12 mos or has a future within next 30 days. See \"Patient Info\" tab in inbasket, or \"Choose Columns\" in Meds & Orders section of the refill encounter.              Failed - Hgb OR Hct on record within the past 12 mos.     Patient need only have had a HGB or HCT on file in the past 12 mos. That result does not need to be normal.    Recent Labs   Lab Test 05/04/21  1227 03/31/21  1555 08/16/19  1212   HGB 11.2* 9.1* 8.2*     Recent Labs   Lab Test 05/04/21  1227 03/31/21  1555 08/16/19  1212   HCT 37.2 31.2* 25.7*       Please verify a HGB or HCT has been checked SINCE THE LAST DOSE CHANGE.            Passed - Patient is 12 years of age or older        Passed - Medication is active on med Yi Barnes RN 05/13/22 1:32 PM  "

## 2022-05-14 DIAGNOSIS — E61.1 IRON DEFICIENCY: ICD-10-CM

## 2022-05-16 RX ORDER — FERROUS SULFATE 325(65) MG
TABLET ORAL
Qty: 60 TABLET | Refills: 0 | OUTPATIENT
Start: 2022-05-16

## 2022-06-11 DIAGNOSIS — E03.9 ACQUIRED HYPOTHYROIDISM: ICD-10-CM

## 2022-06-12 NOTE — TELEPHONE ENCOUNTER
"Routing refill request to provider for review/approval because:  Patient needs to be seen because it has been more than 1 year since last office visit.    Last Written Prescription Date:  3/14/22  Last Fill Quantity: 90,  # refills: 0   Last office visit provider:  5/4/21     Requested Prescriptions   Pending Prescriptions Disp Refills     levothyroxine (SYNTHROID/LEVOTHROID) 125 MCG tablet [Pharmacy Med Name: LEVOTHYROXINE 0.125MG (125MCG) TAB] 90 tablet 0     Sig: TAKE 1 TABLET BY MOUTH EVERY DAY       Thyroid Protocol Failed - 6/11/2022  8:00 AM        Failed - Recent (12 mo) or future (30 days) visit within the authorizing provider's specialty     Patient has had an office visit with the authorizing provider or a provider within the authorizing providers department within the previous 12 mos or has a future within next 30 days. See \"Patient Info\" tab in inbasket, or \"Choose Columns\" in Meds & Orders section of the refill encounter.              Failed - Normal TSH on file in past 12 months     Recent Labs   Lab Test 05/04/21  1227   TSH 4.33              Passed - Patient is 12 years or older        Passed - Medication is active on med list        Passed - No active pregnancy on record     If patient is pregnant or has had a positive pregnancy test, please check TSH.          Passed - No positive pregnancy test in past 12 months     If patient is pregnant or has had a positive pregnancy test, please check TSH.               Yi Urena, RN 06/11/22 7:30 PM  "

## 2022-06-16 RX ORDER — LEVOTHYROXINE SODIUM 125 UG/1
TABLET ORAL
Qty: 90 TABLET | Refills: 0 | OUTPATIENT
Start: 2022-06-16

## 2022-06-16 NOTE — TELEPHONE ENCOUNTER
"Spoke with Antonella. Pt does not need a refill at this time. Antonella stated that Stefany is doing very well and has not been sick so she has not needed to go to see her provider. Antonella was informed that pt should have a yearly preventative care visit and needs to have a thyroid lab drawn prior to her next prescription being written for her levothyroxine. Antonella stated \"do you understand that she is 81 years old and isn't over doctored?\" This writer expressed appreciation for Stefany's good health, however the importance of checking appropriate labs related to certain medications was stressed to Antonella.     Antonella stated that she will assist her mother in making an appointment for her mother prior to her running out of the levothyroxine medication.       Michaelle Santos RN  [facility]    8:56 AM, June 16, 2022        "

## 2023-01-01 ENCOUNTER — LAB REQUISITION (OUTPATIENT)
Dept: LAB | Facility: CLINIC | Age: 83
End: 2023-01-01

## 2023-01-01 DIAGNOSIS — E03.8 OTHER SPECIFIED HYPOTHYROIDISM: ICD-10-CM

## 2023-01-01 DIAGNOSIS — D64.9 ANEMIA, UNSPECIFIED: ICD-10-CM

## 2023-01-01 DIAGNOSIS — Z51.81 ENCOUNTER FOR THERAPEUTIC DRUG LEVEL MONITORING: ICD-10-CM

## 2023-01-01 LAB
ANION GAP SERPL CALCULATED.3IONS-SCNC: 13 MMOL/L (ref 7–15)
ANION GAP SERPL CALCULATED.3IONS-SCNC: 14 MMOL/L (ref 7–15)
BUN SERPL-MCNC: 13.8 MG/DL (ref 8–23)
BUN SERPL-MCNC: 15 MG/DL (ref 8–23)
CALCIUM SERPL-MCNC: 8.7 MG/DL (ref 8.8–10.2)
CALCIUM SERPL-MCNC: 9.1 MG/DL (ref 8.8–10.2)
CHLORIDE SERPL-SCNC: 96 MMOL/L (ref 98–107)
CHLORIDE SERPL-SCNC: 96 MMOL/L (ref 98–107)
CREAT SERPL-MCNC: 1.07 MG/DL (ref 0.51–0.95)
CREAT SERPL-MCNC: 1.08 MG/DL (ref 0.51–0.95)
DEPRECATED HCO3 PLAS-SCNC: 23 MMOL/L (ref 22–29)
DEPRECATED HCO3 PLAS-SCNC: 24 MMOL/L (ref 22–29)
ERYTHROCYTE [DISTWIDTH] IN BLOOD BY AUTOMATED COUNT: 14.1 % (ref 10–15)
FERRITIN SERPL-MCNC: 231 NG/ML (ref 11–328)
GFR SERPL CREATININE-BSD FRML MDRD: 51 ML/MIN/1.73M2
GFR SERPL CREATININE-BSD FRML MDRD: 52 ML/MIN/1.73M2
GLUCOSE SERPL-MCNC: 69 MG/DL (ref 70–99)
GLUCOSE SERPL-MCNC: 72 MG/DL (ref 70–99)
HCT VFR BLD AUTO: 32.2 % (ref 35–47)
HGB BLD-MCNC: 10.7 G/DL (ref 11.7–15.7)
MCH RBC QN AUTO: 30.2 PG (ref 26.5–33)
MCHC RBC AUTO-ENTMCNC: 33.2 G/DL (ref 31.5–36.5)
MCV RBC AUTO: 91 FL (ref 78–100)
PLATELET # BLD AUTO: 294 10E3/UL (ref 150–450)
POTASSIUM SERPL-SCNC: 3.4 MMOL/L (ref 3.4–5.3)
POTASSIUM SERPL-SCNC: 3.7 MMOL/L (ref 3.4–5.3)
RBC # BLD AUTO: 3.54 10E6/UL (ref 3.8–5.2)
SODIUM SERPL-SCNC: 133 MMOL/L (ref 136–145)
SODIUM SERPL-SCNC: 133 MMOL/L (ref 136–145)
TSH SERPL DL<=0.005 MIU/L-ACNC: 75.8 UIU/ML (ref 0.3–4.2)
VIT B12 SERPL-MCNC: 2528 PG/ML (ref 232–1245)
WBC # BLD AUTO: 5.2 10E3/UL (ref 4–11)

## 2023-01-01 PROCEDURE — 82607 VITAMIN B-12: CPT | Performed by: FAMILY MEDICINE

## 2023-01-01 PROCEDURE — 84443 ASSAY THYROID STIM HORMONE: CPT | Performed by: FAMILY MEDICINE

## 2023-01-01 PROCEDURE — P9604 ONE-WAY ALLOW PRORATED TRIP: HCPCS | Performed by: FAMILY MEDICINE

## 2023-01-01 PROCEDURE — 82728 ASSAY OF FERRITIN: CPT | Performed by: FAMILY MEDICINE

## 2023-01-01 PROCEDURE — 80048 BASIC METABOLIC PNL TOTAL CA: CPT | Performed by: FAMILY MEDICINE

## 2023-01-01 PROCEDURE — 36415 COLL VENOUS BLD VENIPUNCTURE: CPT | Performed by: FAMILY MEDICINE

## 2023-01-01 PROCEDURE — 85027 COMPLETE CBC AUTOMATED: CPT | Performed by: FAMILY MEDICINE

## 2023-10-27 NOTE — TELEPHONE ENCOUNTER
Spoke with pt and relayed PCP message.  Pt understanding and agreeable.    see mdm Differential Diagnosis